# Patient Record
Sex: FEMALE | Race: WHITE | NOT HISPANIC OR LATINO | Employment: FULL TIME | URBAN - METROPOLITAN AREA
[De-identification: names, ages, dates, MRNs, and addresses within clinical notes are randomized per-mention and may not be internally consistent; named-entity substitution may affect disease eponyms.]

---

## 2017-08-29 ENCOUNTER — OFFICE VISIT (OUTPATIENT)
Dept: MEDICAL GROUP | Facility: PHYSICIAN GROUP | Age: 42
End: 2017-08-29
Payer: COMMERCIAL

## 2017-08-29 VITALS
DIASTOLIC BLOOD PRESSURE: 72 MMHG | HEIGHT: 68 IN | HEART RATE: 84 BPM | TEMPERATURE: 99.7 F | OXYGEN SATURATION: 100 % | BODY MASS INDEX: 20.16 KG/M2 | RESPIRATION RATE: 14 BRPM | WEIGHT: 133 LBS | SYSTOLIC BLOOD PRESSURE: 120 MMHG

## 2017-08-29 DIAGNOSIS — Z86.19 HISTORY OF HPV INFECTION: ICD-10-CM

## 2017-08-29 DIAGNOSIS — L56.8 PHOTOSENSITIVITY DERMATITIS: ICD-10-CM

## 2017-08-29 DIAGNOSIS — R00.2 HEART PALPITATIONS: ICD-10-CM

## 2017-08-29 PROCEDURE — 99204 OFFICE O/P NEW MOD 45 MIN: CPT | Performed by: NURSE PRACTITIONER

## 2017-08-29 ASSESSMENT — PATIENT HEALTH QUESTIONNAIRE - PHQ9: CLINICAL INTERPRETATION OF PHQ2 SCORE: 0

## 2017-08-29 NOTE — PROGRESS NOTES
Maribel Chavarria is a 41 y.o. white female here today to establish care and for evaluation and management of:    HPI: Patient recently moved here from [facility]. 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  History of HPV infection  Had HPV at 19 years. Cervical colposcopy done at that time.  Recent paps have all been normal.     Heart palpitations  Patient reports that in the past she has had some issues with anxiety and depression after her childbirth and was on citalopram however she weaned herself off of this and has not had any issues. She recently moved here from [facility] and has noticed an increasing heart palpitations. She states that she is not nauseated or sweaty with these palpitations. She notices that sometimes when she lays down to go to bed at night she's had palpitations. Denies chest pain shortness of breath radiation of this pain. States this is occurring most days. Blood pressure today 120/72. Patient's pulse is 84.      Current medicines (including changes today)  No current outpatient prescriptions on file.     No current facility-administered medications for this visit.        She  has a past medical history of Anxiety and Depression.    She  has a past surgical history that includes imary c section.    Social History   Substance Use Topics   • Smoking status: Former Smoker     Years: 4.00     Quit date: 8/29/2004   • Smokeless tobacco: Never Used   • Alcohol use 1.8 oz/week     3 Glasses of wine per week       Social History     Social History Narrative   • No narrative on file       Family History   Problem Relation Age of Onset   • No Known Problems Mother    • No Known Problems Father    • No Known Problems Sister        Family Status   Relation Status   • Mother Alive   • Father Alive   • Sister Alive         ROS  As stated in history of present illness.  All other systems reviewed and are negative     Objective:     Blood pressure 120/72, pulse 84,  "temperature 37.6 °C (99.7 °F), resp. rate 14, height 1.721 m (5' 7.75\"), weight 60.3 kg (133 lb), SpO2 100 %. Body mass index is 20.37 kg/m².  Physical Exam:    Constitutional: Alert, no distress.  Skin: Warm, dry, good turgor, no rashes in visible areas.  Eye: Equal, round and reactive, conjunctiva clear, lids normal.  ENMT: Lips without lesions, good dentition, oropharynx clear.  Neck: Trachea midline, no masses, no thyromegaly. No cervical or supraclavicular lymphadenopathy.  Respiratory: Unlabored respiratory effort, lungs clear to auscultation, no wheezes, no ronchi.  Cardiovascular: Normal S1, S2, no murmur, no edema.  Abdomen: Soft, non-tender, no masses, no hepatosplenomegaly.  Psych: Alert and oriented x3, normal affect and mood.        Assessment and Plan:   The following treatment plan was discussed    1. Heart palpitations  This is a new problem to me. Acute. Ongoing. We will check metabolic levels, thyroid vitamin D and lipids to rule out any metabolic issues. Also a CBC to rule out anemia. EGD precautions reviewed with patient. She verbalizes understanding. Monitor and follow results.  - CBC WITH DIFFERENTIAL; Future  - COMP METABOLIC PANEL; Future  - TSH WITH REFLEX TO FT4; Future  - VITAMIN D,25 HYDROXY; Future  - LIPID PROFILE; Future    2. History of HPV infection  This is a new problem to me. Chronic. Resolved. Patient has had several normal Pap smears after HPV infection at age 19. We will request records from her OB/GYN in [address] [place] [address]. Consent signed. Monitor.    3. Photosensitivity dermatitis  This is a new problem to me. Chronic. Stable. Patient to continue with good sun care including sunscreen hat and protective clothing. Monitor and follow.      Records requested.  Followup: Return in about 1 year (around 8/29/2018), or if symptoms worsen or fail to improve, for Annual.         "

## 2017-08-29 NOTE — ASSESSMENT & PLAN NOTE
Patient reports that in the past she has had some issues with anxiety and depression after her childbirth and was on citalopram however she weaned herself off of this and has not had any issues. She recently moved here from [facility] and has noticed an increasing heart palpitations. She states that she is not nauseated or sweaty with these palpitations. She notices that sometimes when she lays down to go to bed at night she's had palpitations. Denies chest pain shortness of breath radiation of this pain. States this is occurring most days. Blood pressure today 120/72. Patient's pulse is 84.

## 2017-08-29 NOTE — LETTER
[facility]  LUCY Packer  [address] [address]  Fax: [phone]   Authorization for Release/Disclosure of   Protected Health Information   Name: JENNY CASIANO : 1975 SSN: [id number]   Address: [address] [place] [address]   [place] [address] Phone:    [phone] (home)    I authorize the entity listed below to release/disclose the PHI below to:   [facility]/LUCY Packer and LUCY Packer   Provider or Entity Name:  Dr. Tavarez  [facility]   Address   [facility]   Phone:  [phone]    Fax:     Reason for request: continuity of care   Information to be released:    [  ] LAST COLONOSCOPY,  including any PATH REPORT and follow-up  [  ] LAST FIT/COLOGUARD RESULT [  ] LAST DEXA  [  ] LAST MAMMOGRAM  [  ] LAST PAP  [  ] LAST LABS [  ] RETINA EXAM REPORT  [  ] IMMUNIZATION RECORDS  [  ] Release all info      [  ] Check here and initial the line next to each item to release ALL health information INCLUDING  _____ Care and treatment for drug and / or alcohol abuse  _____ HIV testing, infection status, or AIDS  _____ Genetic Testing    DATES OF SERVICE OR TIME PERIOD TO BE DISCLOSED: _____________  I understand and acknowledge that:  * This Authorization may be revoked at any time by you in writing, except if your health information has already been used or disclosed.  * Your health information that will be used or disclosed as a result of you signing this authorization could be re-disclosed by the recipient. If this occurs, your re-disclosed health information may no longer be protected by State or Federal laws.  * You may refuse to sign this Authorization. Your refusal will not affect your ability to obtain treatment.  * This Authorization becomes effective upon signing and will  on (date) __________.      If no date is indicated, this Authorization will  one (1) year from the signature date.    Name: Jenny  Deon    Signature:   Date:     8/29/2017       PLEASE FAX REQUESTED RECORDS BACK TO: [phone]

## 2017-09-25 ENCOUNTER — HOSPITAL ENCOUNTER (OUTPATIENT)
Dept: LAB | Facility: MEDICAL CENTER | Age: 42
End: 2017-09-25
Attending: NURSE PRACTITIONER
Payer: COMMERCIAL

## 2017-09-25 DIAGNOSIS — R00.2 HEART PALPITATIONS: ICD-10-CM

## 2017-09-25 LAB
25(OH)D3 SERPL-MCNC: 31 NG/ML (ref 30–100)
ALBUMIN SERPL BCP-MCNC: 3.9 G/DL (ref 3.2–4.9)
ALBUMIN/GLOB SERPL: 1.3 G/DL
ALP SERPL-CCNC: 51 U/L (ref 30–99)
ALT SERPL-CCNC: 20 U/L (ref 2–50)
ANION GAP SERPL CALC-SCNC: 6 MMOL/L (ref 0–11.9)
AST SERPL-CCNC: 16 U/L (ref 12–45)
BASOPHILS # BLD AUTO: 0.5 % (ref 0–1.8)
BASOPHILS # BLD: 0.03 K/UL (ref 0–0.12)
BILIRUB SERPL-MCNC: 1 MG/DL (ref 0.1–1.5)
BUN SERPL-MCNC: 13 MG/DL (ref 8–22)
CALCIUM SERPL-MCNC: 8.7 MG/DL (ref 8.5–10.5)
CHLORIDE SERPL-SCNC: 107 MMOL/L (ref 96–112)
CHOLEST SERPL-MCNC: 170 MG/DL (ref 100–199)
CO2 SERPL-SCNC: 23 MMOL/L (ref 20–33)
CREAT SERPL-MCNC: 0.74 MG/DL (ref 0.5–1.4)
EOSINOPHIL # BLD AUTO: 0.06 K/UL (ref 0–0.51)
EOSINOPHIL NFR BLD: 1.1 % (ref 0–6.9)
ERYTHROCYTE [DISTWIDTH] IN BLOOD BY AUTOMATED COUNT: 41.5 FL (ref 35.9–50)
GFR SERPL CREATININE-BSD FRML MDRD: >60 ML/MIN/1.73 M 2
GLOBULIN SER CALC-MCNC: 2.9 G/DL (ref 1.9–3.5)
GLUCOSE SERPL-MCNC: 86 MG/DL (ref 65–99)
HCT VFR BLD AUTO: 40.6 % (ref 37–47)
HDLC SERPL-MCNC: 85 MG/DL
HGB BLD-MCNC: 13.4 G/DL (ref 12–16)
IMM GRANULOCYTES # BLD AUTO: 0.01 K/UL (ref 0–0.11)
IMM GRANULOCYTES NFR BLD AUTO: 0.2 % (ref 0–0.9)
LDLC SERPL CALC-MCNC: 76 MG/DL
LYMPHOCYTES # BLD AUTO: 1.72 K/UL (ref 1–4.8)
LYMPHOCYTES NFR BLD: 30.3 % (ref 22–41)
MCH RBC QN AUTO: 29.3 PG (ref 27–33)
MCHC RBC AUTO-ENTMCNC: 33 G/DL (ref 33.6–35)
MCV RBC AUTO: 88.6 FL (ref 81.4–97.8)
MONOCYTES # BLD AUTO: 0.4 K/UL (ref 0–0.85)
MONOCYTES NFR BLD AUTO: 7 % (ref 0–13.4)
NEUTROPHILS # BLD AUTO: 3.46 K/UL (ref 2–7.15)
NEUTROPHILS NFR BLD: 60.9 % (ref 44–72)
NRBC # BLD AUTO: 0 K/UL
NRBC BLD AUTO-RTO: 0 /100 WBC
PLATELET # BLD AUTO: 204 K/UL (ref 164–446)
PMV BLD AUTO: 11.1 FL (ref 9–12.9)
POTASSIUM SERPL-SCNC: 4 MMOL/L (ref 3.6–5.5)
PROT SERPL-MCNC: 6.8 G/DL (ref 6–8.2)
RBC # BLD AUTO: 4.58 M/UL (ref 4.2–5.4)
SODIUM SERPL-SCNC: 136 MMOL/L (ref 135–145)
TRIGL SERPL-MCNC: 46 MG/DL (ref 0–149)
TSH SERPL DL<=0.005 MIU/L-ACNC: 2.7 UIU/ML (ref 0.3–3.7)
WBC # BLD AUTO: 5.7 K/UL (ref 4.8–10.8)

## 2017-09-25 PROCEDURE — 85025 COMPLETE CBC W/AUTO DIFF WBC: CPT

## 2017-09-25 PROCEDURE — 80053 COMPREHEN METABOLIC PANEL: CPT

## 2017-09-25 PROCEDURE — 80061 LIPID PANEL: CPT

## 2017-09-25 PROCEDURE — 84443 ASSAY THYROID STIM HORMONE: CPT

## 2017-09-25 PROCEDURE — 82306 VITAMIN D 25 HYDROXY: CPT

## 2017-09-25 PROCEDURE — 36415 COLL VENOUS BLD VENIPUNCTURE: CPT

## 2018-04-03 ENCOUNTER — OFFICE VISIT (OUTPATIENT)
Dept: URGENT CARE | Facility: PHYSICIAN GROUP | Age: 43
End: 2018-04-03
Payer: COMMERCIAL

## 2018-04-03 VITALS
SYSTOLIC BLOOD PRESSURE: 120 MMHG | OXYGEN SATURATION: 94 % | HEART RATE: 90 BPM | DIASTOLIC BLOOD PRESSURE: 70 MMHG | BODY MASS INDEX: 21.97 KG/M2 | RESPIRATION RATE: 16 BRPM | HEIGHT: 67 IN | WEIGHT: 140 LBS | TEMPERATURE: 99.3 F

## 2018-04-03 DIAGNOSIS — R00.0 TACHYCARDIA: ICD-10-CM

## 2018-04-03 DIAGNOSIS — R00.2 PALPITATIONS: ICD-10-CM

## 2018-04-03 PROCEDURE — 99203 OFFICE O/P NEW LOW 30 MIN: CPT | Performed by: FAMILY MEDICINE

## 2018-04-03 ASSESSMENT — ENCOUNTER SYMPTOMS
EYE DISCHARGE: 0
EYE REDNESS: 0
BACK PAIN: 0
VOMITING: 0
PND: 0
NECK PAIN: 0
COUGH: 0
ORTHOPNEA: 0
WEIGHT LOSS: 0
NERVOUS/ANXIOUS: 1
FLANK PAIN: 0
FOCAL WEAKNESS: 0
FEVER: 0
SPUTUM PRODUCTION: 0
NAUSEA: 0
SENSORY CHANGE: 0
SORE THROAT: 0

## 2018-04-03 ASSESSMENT — LIFESTYLE VARIABLES: SUBSTANCE_ABUSE: 0

## 2018-04-03 NOTE — PROGRESS NOTES
"Subjective:      Maribel Chavarria is a 42 y.o. female who presents with Chest Pain (chest tightness, heart palpitations, poss htn)            Recurrent problem. Intermittent over months. PCP checked thyroid and normal. Heart palpitation/feels fast. No CP but tightness and SOB with palpitations. No limb swelling. Siting up and drinking water helps. She has eliminated caffeine and has rare EtOH without change is sx's. No other aggravating or alleviating factors.          Review of Systems   Constitutional: Negative for fever and weight loss.   HENT: Negative for ear discharge, ear pain and sore throat.    Eyes: Negative for discharge and redness.   Respiratory: Negative for cough and sputum production.    Cardiovascular: Negative for orthopnea and PND.   Gastrointestinal: Negative for nausea and vomiting.   Genitourinary: Negative for flank pain and hematuria.   Musculoskeletal: Negative for back pain and neck pain.   Skin: Negative for itching and rash.   Neurological: Negative for sensory change and focal weakness.   Psychiatric/Behavioral: Negative for substance abuse. The patient is nervous/anxious (she tends toward this).      .  Medications, Allergies, and current problem list reviewed today in Epic  Past Medical History:   Diagnosis Date   • Anxiety    • Depression      Social History   Substance Use Topics   • Smoking status: Former Smoker     Years: 4.00     Quit date: 8/29/2004   • Smokeless tobacco: Never Used   • Alcohol use 1.8 oz/week     3 Glasses of wine per week     Family History   Problem Relation Age of Onset   • No Known Problems Mother    • No Known Problems Father    • No Known Problems Sister    Mom with irregular heartbeat, unsure of exact diagnosis       Objective:     /70   Pulse 90   Temp 37.4 °C (99.3 °F)   Resp 16   Ht 1.702 m (5' 7\")   Wt 63.5 kg (140 lb)   SpO2 94%   BMI 21.93 kg/m²      Physical Exam   Constitutional: She is oriented to person, place, and time. She " appears well-developed and well-nourished. No distress.   HENT:   Head: Normocephalic and atraumatic.   Mouth/Throat: Oropharynx is clear and moist.   Eyes: Conjunctivae and EOM are normal. Pupils are equal, round, and reactive to light.   Neck: Neck supple. No JVD present.   Cardiovascular: Normal rate, regular rhythm and normal heart sounds.    No murmur heard.  Pulmonary/Chest: Effort normal and breath sounds normal. She has no wheezes.   Neurological: She is alert and oriented to person, place, and time.   Speech is clear. Patient is appropriate and cooperative.     Skin: Skin is warm and dry. No rash noted.               Assessment/Plan:     EKG: sinus tach 103, normal axis, normal intervals, no evidence of ischemia or hypertrophy.    Repeat pulse ox 100%    1. Palpitations  REFERRAL TO CARDIOLOGY   2. Tachycardia  REFERRAL TO CARDIOLOGY     Differential diagnosis, natural history, supportive care, and indications for immediate follow-up discussed at length.     F/u cardiology for consideration of event or holter monitor.

## 2018-04-16 ENCOUNTER — TELEPHONE (OUTPATIENT)
Dept: CARDIOLOGY | Facility: MEDICAL CENTER | Age: 43
End: 2018-04-16

## 2018-04-16 NOTE — TELEPHONE ENCOUNTER
Spoke with pt who sated this is her first time seeing a cardiologist. Reviewed with pt that we have all current cardiac testing and lab work in chart.

## 2018-04-17 ENCOUNTER — OFFICE VISIT (OUTPATIENT)
Dept: CARDIOLOGY | Facility: MEDICAL CENTER | Age: 43
End: 2018-04-17
Payer: COMMERCIAL

## 2018-04-17 VITALS
HEART RATE: 78 BPM | BODY MASS INDEX: 22.44 KG/M2 | SYSTOLIC BLOOD PRESSURE: 102 MMHG | DIASTOLIC BLOOD PRESSURE: 72 MMHG | HEIGHT: 67 IN | OXYGEN SATURATION: 98 % | WEIGHT: 143 LBS

## 2018-04-17 DIAGNOSIS — R00.2 PALPITATIONS: ICD-10-CM

## 2018-04-17 PROCEDURE — 99214 OFFICE O/P EST MOD 30 MIN: CPT | Performed by: INTERNAL MEDICINE

## 2018-04-17 ASSESSMENT — ENCOUNTER SYMPTOMS
NERVOUS/ANXIOUS: 1
PHOTOPHOBIA: 1
CONSTIPATION: 1
ORTHOPNEA: 0
INSOMNIA: 1
ABDOMINAL PAIN: 0
LOSS OF CONSCIOUSNESS: 0
SHORTNESS OF BREATH: 0
PND: 0
PALPITATIONS: 1
MYALGIAS: 0
CHILLS: 0
DIZZINESS: 0
HEADACHES: 1
BLURRED VISION: 0
FEVER: 0

## 2018-04-17 NOTE — PROGRESS NOTES
"Chief Complaint   Patient presents with   • Palpitations       Subjective:   Maribel Chavarria is a 42 y.o. female who presents today referred by her PCP Dr. Eliezer Casey for cardiology consultation for evaluation of palpitations.    The patient states that she's had palpitations \"all my life\" since she was a teenager.  She and her family moved from [facility] to [place], [place] in July, 2017.  Since August or September, 2017 the patient has had substantial increasing episodes in frequency of palpitations.  She describes him as her heart racing or skipping.  No specific trigger though seems to be worse the week prior to her menstrual cycle.  Also can occur spontaneously or with some mild anxiety.  On 4/3/2017 went to urgent care.  EKG showed sinus tachycardia, rate 103.    Has a history of \"a little anxiety\" and some mild depression along with    some insomnia.  Takes nighttime Benadryl and Advil.  Also occasionally takes some herbal medicine which she also contains ginseng and Pamplico's wort.    Social history  Smoked in the remote past on and off for 4 years.  .  . Children.    Family history  Mother with hypertension.    Past Medical History:   Diagnosis Date   • Anxiety    • Depression      Past Surgical History:   Procedure Laterality Date   • PRIMARY C SECTION       Family History   Problem Relation Age of Onset   • No Known Problems Mother    • No Known Problems Father    • No Known Problems Sister      Social History     Social History   • Marital status:      Spouse name: N/A   • Number of children: N/A   • Years of education: N/A     Occupational History   • Not on file.     Social History Main Topics   • Smoking status: Former Smoker     Years: 4.00     Quit date: 8/29/2004   • Smokeless tobacco: Never Used   • Alcohol use 1.8 oz/week     3 Glasses of wine per week   • Drug use: No   • Sexual activity: Yes     Partners: Male     Birth control/ " "protection: Condom     Other Topics Concern   • Not on file     Social History Narrative   • No narrative on file     Allergies   Allergen Reactions   • Keflex      Outpatient Encounter Prescriptions as of 4/17/2018   Medication Sig Dispense Refill   • DiphenhydrAMINE HCl (BENADRYL ALLERGY PO) Take  by mouth.       No facility-administered encounter medications on file as of 4/17/2018.      Review of Systems   Constitutional: Negative for chills and fever.   HENT: Positive for tinnitus. Negative for congestion.    Eyes: Positive for photophobia. Negative for blurred vision.   Respiratory: Negative for shortness of breath.    Cardiovascular: Positive for palpitations. Negative for chest pain, orthopnea, leg swelling and PND.   Gastrointestinal: Positive for constipation. Negative for abdominal pain.   Genitourinary: Negative for dysuria.   Musculoskeletal: Negative for joint pain and myalgias.   Skin: Negative for rash.   Neurological: Positive for headaches. Negative for dizziness and loss of consciousness.   Endo/Heme/Allergies: Positive for environmental allergies.   Psychiatric/Behavioral: The patient is nervous/anxious and has insomnia.         Objective:   /72   Pulse 78   Ht 1.702 m (5' 7\")   Wt 64.9 kg (143 lb)   SpO2 98%   BMI 22.40 kg/m²     Physical Exam   Constitutional: She is oriented to person, place, and time. She appears well-developed and well-nourished.   HENT:   Head: Normocephalic and atraumatic.   Eyes: EOM are normal. Pupils are equal, round, and reactive to light. No scleral icterus.   Neck: Neck supple. No JVD present. No thyromegaly present.   Cardiovascular: Normal rate, regular rhythm, normal heart sounds and intact distal pulses.  Exam reveals no gallop and no friction rub.    No murmur heard.  Pulmonary/Chest: Effort normal and breath sounds normal. No respiratory distress. She has no wheezes. She has no rales.   Abdominal: Soft. Bowel sounds are normal. She exhibits no mass. " There is no tenderness.   Musculoskeletal: Normal range of motion. She exhibits no edema or deformity.   Lymphadenopathy:     She has no cervical adenopathy.   Neurological: She is alert and oriented to person, place, and time. No cranial nerve deficit. She exhibits normal muscle tone.   Skin: Skin is warm and dry.   Psychiatric: She has a normal mood and affect. Her behavior is normal.     04/03/2018 EKG: Normal sinus rhythm, rate 103. Rsr'. Reviewed by myself.    Assessment:     1. Palpitations  HOLTER MONITOR / EVENT RECORDER       Medical Decision Making:  Today's Assessment / Status / Plan:     The patient has a history of chronic palpitations with increasing frequency.  Will obtain a [employer] event recorder to document any specific cardiac arrhythmia.  Follow-up one month.    Thank you from me see this lady in consultation

## 2018-05-23 ENCOUNTER — HOSPITAL ENCOUNTER (OUTPATIENT)
Dept: RADIOLOGY | Facility: MEDICAL CENTER | Age: 43
End: 2018-05-23
Attending: NURSE PRACTITIONER
Payer: COMMERCIAL

## 2018-05-23 DIAGNOSIS — Z12.31 SCREENING MAMMOGRAM, ENCOUNTER FOR: ICD-10-CM

## 2018-05-23 PROCEDURE — 77067 SCR MAMMO BI INCL CAD: CPT

## 2018-06-15 ENCOUNTER — HOSPITAL ENCOUNTER (OUTPATIENT)
Dept: RADIOLOGY | Facility: MEDICAL CENTER | Age: 43
End: 2018-06-15

## 2018-06-29 ENCOUNTER — HOSPITAL ENCOUNTER (OUTPATIENT)
Dept: RADIOLOGY | Facility: MEDICAL CENTER | Age: 43
End: 2018-06-29

## 2018-08-13 ENCOUNTER — HOSPITAL ENCOUNTER (OUTPATIENT)
Facility: MEDICAL CENTER | Age: 43
End: 2018-08-13
Attending: NURSE PRACTITIONER
Payer: COMMERCIAL

## 2018-08-13 ENCOUNTER — OFFICE VISIT (OUTPATIENT)
Dept: MEDICAL GROUP | Facility: PHYSICIAN GROUP | Age: 43
End: 2018-08-13
Payer: COMMERCIAL

## 2018-08-13 VITALS
DIASTOLIC BLOOD PRESSURE: 62 MMHG | HEIGHT: 68 IN | OXYGEN SATURATION: 99 % | WEIGHT: 139 LBS | SYSTOLIC BLOOD PRESSURE: 102 MMHG | TEMPERATURE: 98.1 F | HEART RATE: 82 BPM | BODY MASS INDEX: 21.07 KG/M2

## 2018-08-13 DIAGNOSIS — Z12.4 SCREENING FOR CERVICAL CANCER: ICD-10-CM

## 2018-08-13 DIAGNOSIS — Z86.19 HISTORY OF HPV INFECTION: ICD-10-CM

## 2018-08-13 DIAGNOSIS — Z01.419 WELL WOMAN EXAM WITH ROUTINE GYNECOLOGICAL EXAM: ICD-10-CM

## 2018-08-13 PROCEDURE — 87624 HPV HI-RISK TYP POOLED RSLT: CPT

## 2018-08-13 PROCEDURE — 99000 SPECIMEN HANDLING OFFICE-LAB: CPT | Performed by: NURSE PRACTITIONER

## 2018-08-13 PROCEDURE — 88175 CYTOPATH C/V AUTO FLUID REDO: CPT

## 2018-08-13 PROCEDURE — 99386 PREV VISIT NEW AGE 40-64: CPT | Performed by: NURSE PRACTITIONER

## 2018-08-13 ASSESSMENT — PATIENT HEALTH QUESTIONNAIRE - PHQ9: CLINICAL INTERPRETATION OF PHQ2 SCORE: 0

## 2018-08-13 NOTE — ASSESSMENT & PLAN NOTE
Here for well woman exam.  Reports that she has had a two yeast infections this past year.  No antibiotic use, but increased sweets.  These have resolve.d  No itching, discharge reported.  Regular periods reported.  Up to date on mammograms.  Last one was normal findings.  History of one + HPV at age 19, but negative since.  Using condoms as birth control.  BMI 21.29 today.

## 2018-08-13 NOTE — PROGRESS NOTES
CC:  Pap/Well Woman Exam    History of present illness:  Maribel Chavarria is 42 y.o.white female presenting today for well woman exam with gynecological exam and Pap smear.   She reports her periods are regular.  She is currently using  condoms as birth control. Diet . Exercise BMI 21.29    History of HPV infection  Had + HPV at age 19 and then PAP's have been WNL      Well woman exam with routine gynecological exam  Here for well woman exam.  Reports that she has had a two yeast infections this past year.  No antibiotic use, but increased sweets.  These have resolve.d  No itching, discharge reported.  Regular periods reported.  Up to date on mammograms.  Last one was normal findings.  History of one + HPV at age 19, but negative since.  Using condoms as birth control.  BMI 21.29 today.       Past Medical History:   Diagnosis Date   • Anxiety    • Depression        Past Surgical History:   Procedure Laterality Date   • PRIMARY C SECTION         Outpatient Encounter Prescriptions as of 8/13/2018   Medication Sig Dispense Refill   • [DISCONTINUED] DiphenhydrAMINE HCl (BENADRYL ALLERGY PO) Take  by mouth.       No facility-administered encounter medications on file as of 8/13/2018.        Patient Active Problem List    Diagnosis Date Noted   • Well woman exam with routine gynecological exam 08/13/2018   • Heart palpitations 08/29/2017   • History of HPV infection 08/29/2017   • Photosensitivity dermatitis 08/29/2017       .  Social History     Social History   • Marital status:      Spouse name: N/A   • Number of children: N/A   • Years of education: N/A     Occupational History   • Not on file.     Social History Main Topics   • Smoking status: Former Smoker     Years: 4.00     Quit date: 8/29/2004   • Smokeless tobacco: Never Used   • Alcohol use 1.8 oz/week     3 Glasses of wine per week   • Drug use: No   • Sexual activity: Yes     Partners: Male     Birth control/ protection: Condom     Other Topics  "Concern   • Not on file     Social History Narrative   • No narrative on file       Family History   Problem Relation Age of Onset   • No Known Problems Mother    • No Known Problems Father    • No Known Problems Sister          ROS:  Denies Weight loss, fatigue, chest pain, SOB, bowel or bladder changes. No significant dysmenorrhea, concerning vaginal discharge or irritation, no dyspareunia or postcoital bleeding. Denies h/o migraine with aura. Denies musculoskeletal, neurological, or psychiatric problems.      /62   Pulse 82   Temp 36.7 °C (98.1 °F)   Ht 1.721 m (5' 7.75\")   Wt 63 kg (139 lb)   SpO2 99%   BMI 21.29 kg/m²     GEN:  Appears well and in no apparent distress   NECK:  Supple without adenopathy or thyromegaly  LUNGS:  Clear and equal. No wheeze, ronchi, or rales.  CV:  RRR, S1, S2. No murmur.  Pedal pulses 2+ bilaterally.  BREAST:  Symmetrical without masses. No nipple discharge.  ABD:  Soft, non-tender, non-distended, normal bowel sounds.  No hepatosplenomegaly.  :  Normal external female genitalia.  Vaginal canal clear.  Cervix appears normal. Specimen collected from transformation zone. Bimanual exam:  No CMT, normal size uterus without masses or tenderness; no adnexal masses or tenderness.      Assessment and plan    1. Screening for cervical cancer  Specimen obtained from cervical os.  To pathology  Monitor and follow.   - THINPREP PAP WITH HPV; Future    2. Well woman exam with routine gynecological exam  Here for well woman.  Reviewed BSE and preventative care.  Questions reviewed.      3. History of HPV infection  Chronic, historical issue.  Monitor and follow results of PAP.      A chaperone was offered to the patient during today's exam. Patient declined chaperone.        F/u pending results  "

## 2018-08-15 LAB
CYTOLOGY REG CYTOL: NORMAL
HPV HR 12 DNA CVX QL NAA+PROBE: NEGATIVE
HPV16 DNA SPEC QL NAA+PROBE: NEGATIVE
HPV18 DNA SPEC QL NAA+PROBE: NEGATIVE
SPECIMEN SOURCE: NORMAL

## 2019-07-15 ENCOUNTER — APPOINTMENT (OUTPATIENT)
Dept: RADIOLOGY | Facility: MEDICAL CENTER | Age: 44
End: 2019-07-15
Attending: NURSE PRACTITIONER
Payer: COMMERCIAL

## 2019-08-05 ENCOUNTER — HOSPITAL ENCOUNTER (OUTPATIENT)
Dept: RADIOLOGY | Facility: MEDICAL CENTER | Age: 44
End: 2019-08-05
Attending: NURSE PRACTITIONER
Payer: COMMERCIAL

## 2019-08-05 DIAGNOSIS — Z12.31 SCREENING MAMMOGRAM, ENCOUNTER FOR: ICD-10-CM

## 2019-08-05 PROCEDURE — 77063 BREAST TOMOSYNTHESIS BI: CPT

## 2019-09-02 ENCOUNTER — OFFICE VISIT (OUTPATIENT)
Dept: URGENT CARE | Facility: PHYSICIAN GROUP | Age: 44
End: 2019-09-02
Payer: COMMERCIAL

## 2019-09-02 VITALS
SYSTOLIC BLOOD PRESSURE: 132 MMHG | HEIGHT: 67 IN | BODY MASS INDEX: 22.44 KG/M2 | WEIGHT: 143 LBS | DIASTOLIC BLOOD PRESSURE: 64 MMHG | OXYGEN SATURATION: 98 % | TEMPERATURE: 99.4 F | RESPIRATION RATE: 16 BRPM | HEART RATE: 83 BPM

## 2019-09-02 DIAGNOSIS — K64.5 THROMBOSED EXTERNAL HEMORRHOID: ICD-10-CM

## 2019-09-02 PROCEDURE — 46083 INC THROMBOSED HROID XTRNL: CPT | Performed by: FAMILY MEDICINE

## 2019-09-02 RX ORDER — LIDOCAINE HYDROCHLORIDE 30 MG/G
CREAM TOPICAL
Qty: 1 TUBE | Refills: 0 | Status: ON HOLD | OUTPATIENT
Start: 2019-09-02 | End: 2019-09-11

## 2019-09-02 ASSESSMENT — ENCOUNTER SYMPTOMS
WEIGHT LOSS: 0
NAUSEA: 0
EYE DISCHARGE: 0
VOMITING: 0
BRUISES/BLEEDS EASILY: 0
MYALGIAS: 0
EYE REDNESS: 0

## 2019-09-02 NOTE — PROGRESS NOTES
"Subjective:      Maribel Chavarria is a 43 y.o. female who presents with Hemorrhoids            3 days painful hemorrhoid. Grape sized.  Concerned it might be thrombosed.  Pain is moderate to severe.  No change in bowel habits.  No active bleeding.  No PMH cancer or unwanted weight loss.  No other aggravating or alleviating factors.      Review of Systems   Constitutional: Negative for malaise/fatigue and weight loss.   Eyes: Negative for discharge and redness.   Gastrointestinal: Negative for nausea and vomiting.   Musculoskeletal: Negative for joint pain and myalgias.   Skin: Negative for itching and rash.   Endo/Heme/Allergies: Does not bruise/bleed easily.     .  Medications, Allergies, and current problem list reviewed today in Epic       Objective:     /64   Pulse 83   Temp 37.4 °C (99.4 °F)   Resp 16   Ht 1.702 m (5' 7\")   Wt 64.9 kg (143 lb)   SpO2 98%   BMI 22.40 kg/m²      Physical Exam   Constitutional: She is oriented to person, place, and time. She appears well-developed and well-nourished. No distress.   HENT:   Head: Normocephalic and atraumatic.   Eyes: Conjunctivae are normal.   Cardiovascular: Normal rate, regular rhythm and normal heart sounds.   No murmur heard.  Pulmonary/Chest: Effort normal and breath sounds normal. She has no wheezes.   Genitourinary:         Neurological: She is alert and oriented to person, place, and time.   Skin: Skin is warm and dry. No rash noted.          Procedure: Thrombosed hemorrhoid evacuation  Risk versus benefit discussed  Clean technique with sterile instruments  Local anesthesia 2% lidocaine with epinephrine  Incised with 11 blade and clot expressed.  Irrigated.       Assessment/Plan:     1. Thrombosed external hemorrhoid  REFERRAL TO GENERAL SURGERY    Lidocaine HCl 3 % Cream     Differential diagnosis, natural history, supportive care, and indications for immediate follow-up discussed at length.     "

## 2019-09-10 DIAGNOSIS — Z01.812 PRE-OPERATIVE LABORATORY EXAMINATION: ICD-10-CM

## 2019-09-10 LAB — HCG SERPL QL: NEGATIVE

## 2019-09-10 PROCEDURE — 36415 COLL VENOUS BLD VENIPUNCTURE: CPT

## 2019-09-10 PROCEDURE — 84703 CHORIONIC GONADOTROPIN ASSAY: CPT

## 2019-09-10 RX ORDER — COVID-19 ANTIGEN TEST
220-400 KIT MISCELLANEOUS 2 TIMES DAILY PRN
COMMUNITY
End: 2020-08-21

## 2019-09-10 RX ORDER — DIPHENHYDRAMINE HCL 25 MG
25 TABLET ORAL EVERY 6 HOURS PRN
COMMUNITY
End: 2022-02-16

## 2019-09-10 SDOH — HEALTH STABILITY: MENTAL HEALTH: HOW OFTEN DO YOU HAVE 6 OR MORE DRINKS ON ONE OCCASION?: NEVER

## 2019-09-11 ENCOUNTER — HOSPITAL ENCOUNTER (OUTPATIENT)
Facility: MEDICAL CENTER | Age: 44
End: 2019-09-11
Attending: COLON & RECTAL SURGERY | Admitting: COLON & RECTAL SURGERY
Payer: COMMERCIAL

## 2019-09-11 ENCOUNTER — ANESTHESIA (OUTPATIENT)
Dept: SURGERY | Facility: MEDICAL CENTER | Age: 44
End: 2019-09-11
Payer: COMMERCIAL

## 2019-09-11 ENCOUNTER — ANESTHESIA EVENT (OUTPATIENT)
Dept: SURGERY | Facility: MEDICAL CENTER | Age: 44
End: 2019-09-11
Payer: COMMERCIAL

## 2019-09-11 VITALS
SYSTOLIC BLOOD PRESSURE: 98 MMHG | TEMPERATURE: 97.7 F | DIASTOLIC BLOOD PRESSURE: 67 MMHG | BODY MASS INDEX: 22.32 KG/M2 | HEART RATE: 79 BPM | RESPIRATION RATE: 18 BRPM | HEIGHT: 67 IN | OXYGEN SATURATION: 98 % | WEIGHT: 142.2 LBS

## 2019-09-11 DIAGNOSIS — G89.18 POSTOPERATIVE PAIN: ICD-10-CM

## 2019-09-11 LAB — PATHOLOGY CONSULT NOTE: NORMAL

## 2019-09-11 PROCEDURE — 160046 HCHG PACU - 1ST 60 MINS PHASE II: Performed by: COLON & RECTAL SURGERY

## 2019-09-11 PROCEDURE — 160002 HCHG RECOVERY MINUTES (STAT): Performed by: COLON & RECTAL SURGERY

## 2019-09-11 PROCEDURE — A6403 STERILE GAUZE>16 <= 48 SQ IN: HCPCS | Performed by: COLON & RECTAL SURGERY

## 2019-09-11 PROCEDURE — 700105 HCHG RX REV CODE 258: Performed by: COLON & RECTAL SURGERY

## 2019-09-11 PROCEDURE — 160048 HCHG OR STATISTICAL LEVEL 1-5: Performed by: COLON & RECTAL SURGERY

## 2019-09-11 PROCEDURE — 160039 HCHG SURGERY MINUTES - EA ADDL 1 MIN LEVEL 3: Performed by: COLON & RECTAL SURGERY

## 2019-09-11 PROCEDURE — 160009 HCHG ANES TIME/MIN: Performed by: COLON & RECTAL SURGERY

## 2019-09-11 PROCEDURE — 160025 RECOVERY II MINUTES (STATS): Performed by: COLON & RECTAL SURGERY

## 2019-09-11 PROCEDURE — 700111 HCHG RX REV CODE 636 W/ 250 OVERRIDE (IP): Performed by: ANESTHESIOLOGY

## 2019-09-11 PROCEDURE — 700101 HCHG RX REV CODE 250: Performed by: ANESTHESIOLOGY

## 2019-09-11 PROCEDURE — 700111 HCHG RX REV CODE 636 W/ 250 OVERRIDE (IP)

## 2019-09-11 PROCEDURE — 160028 HCHG SURGERY MINUTES - 1ST 30 MINS LEVEL 3: Performed by: COLON & RECTAL SURGERY

## 2019-09-11 PROCEDURE — 700101 HCHG RX REV CODE 250: Performed by: COLON & RECTAL SURGERY

## 2019-09-11 PROCEDURE — 160035 HCHG PACU - 1ST 60 MINS PHASE I: Performed by: COLON & RECTAL SURGERY

## 2019-09-11 PROCEDURE — 501838 HCHG SUTURE GENERAL: Performed by: COLON & RECTAL SURGERY

## 2019-09-11 PROCEDURE — 88304 TISSUE EXAM BY PATHOLOGIST: CPT

## 2019-09-11 RX ORDER — LIDOCAINE HYDROCHLORIDE 30 MG/G
1 CREAM TOPICAL PRN
COMMUNITY
End: 2020-08-21

## 2019-09-11 RX ORDER — HYDROMORPHONE HYDROCHLORIDE 1 MG/ML
0.2 INJECTION, SOLUTION INTRAMUSCULAR; INTRAVENOUS; SUBCUTANEOUS
Status: DISCONTINUED | OUTPATIENT
Start: 2019-09-11 | End: 2019-09-11 | Stop reason: HOSPADM

## 2019-09-11 RX ORDER — CLINDAMYCIN PHOSPHATE 150 MG/ML
INJECTION, SOLUTION INTRAVENOUS PRN
Status: DISCONTINUED | OUTPATIENT
Start: 2019-09-11 | End: 2019-09-11 | Stop reason: SURG

## 2019-09-11 RX ORDER — ONDANSETRON 2 MG/ML
INJECTION INTRAMUSCULAR; INTRAVENOUS PRN
Status: DISCONTINUED | OUTPATIENT
Start: 2019-09-11 | End: 2019-09-11 | Stop reason: SURG

## 2019-09-11 RX ORDER — SODIUM CHLORIDE, SODIUM LACTATE, POTASSIUM CHLORIDE, CALCIUM CHLORIDE 600; 310; 30; 20 MG/100ML; MG/100ML; MG/100ML; MG/100ML
INJECTION, SOLUTION INTRAVENOUS CONTINUOUS
Status: DISCONTINUED | OUTPATIENT
Start: 2019-09-11 | End: 2019-09-11

## 2019-09-11 RX ORDER — DIPHENHYDRAMINE HYDROCHLORIDE 50 MG/ML
INJECTION INTRAMUSCULAR; INTRAVENOUS PRN
Status: DISCONTINUED | OUTPATIENT
Start: 2019-09-11 | End: 2019-09-11 | Stop reason: SURG

## 2019-09-11 RX ORDER — LIDOCAINE HYDROCHLORIDE 20 MG/ML
INJECTION, SOLUTION EPIDURAL; INFILTRATION; INTRACAUDAL; PERINEURAL PRN
Status: DISCONTINUED | OUTPATIENT
Start: 2019-09-11 | End: 2019-09-11 | Stop reason: SURG

## 2019-09-11 RX ORDER — OXYCODONE HCL 5 MG/5 ML
10 SOLUTION, ORAL ORAL
Status: DISCONTINUED | OUTPATIENT
Start: 2019-09-11 | End: 2019-09-11 | Stop reason: HOSPADM

## 2019-09-11 RX ORDER — MEPERIDINE HYDROCHLORIDE 25 MG/ML
6.25 INJECTION INTRAMUSCULAR; INTRAVENOUS; SUBCUTANEOUS
Status: DISCONTINUED | OUTPATIENT
Start: 2019-09-11 | End: 2019-09-11 | Stop reason: HOSPADM

## 2019-09-11 RX ORDER — OXYCODONE HCL 5 MG/5 ML
5 SOLUTION, ORAL ORAL EVERY 4 HOURS PRN
Status: DISCONTINUED | OUTPATIENT
Start: 2019-09-11 | End: 2019-09-11 | Stop reason: HOSPADM

## 2019-09-11 RX ORDER — BUPIVACAINE HYDROCHLORIDE AND EPINEPHRINE 5; 5 MG/ML; UG/ML
INJECTION, SOLUTION EPIDURAL; INTRACAUDAL; PERINEURAL
Status: DISCONTINUED | OUTPATIENT
Start: 2019-09-11 | End: 2019-09-11 | Stop reason: HOSPADM

## 2019-09-11 RX ORDER — OXYCODONE HYDROCHLORIDE 5 MG/1
5 TABLET ORAL EVERY 4 HOURS PRN
Qty: 30 TAB | Refills: 0 | Status: SHIPPED | OUTPATIENT
Start: 2019-09-11 | End: 2019-09-16

## 2019-09-11 RX ORDER — SODIUM CHLORIDE, SODIUM LACTATE, POTASSIUM CHLORIDE, AND CALCIUM CHLORIDE .6; .31; .03; .02 G/100ML; G/100ML; G/100ML; G/100ML
500 INJECTION, SOLUTION INTRAVENOUS
Status: DISCONTINUED | OUTPATIENT
Start: 2019-09-11 | End: 2019-09-11 | Stop reason: HOSPADM

## 2019-09-11 RX ORDER — MEPERIDINE HYDROCHLORIDE 25 MG/ML
INJECTION INTRAMUSCULAR; INTRAVENOUS; SUBCUTANEOUS
Status: COMPLETED
Start: 2019-09-11 | End: 2019-09-11

## 2019-09-11 RX ORDER — SCOLOPAMINE TRANSDERMAL SYSTEM 1 MG/1
1 PATCH, EXTENDED RELEASE TRANSDERMAL ONCE
Status: DISCONTINUED | OUTPATIENT
Start: 2019-09-11 | End: 2019-09-11 | Stop reason: HOSPADM

## 2019-09-11 RX ORDER — LIDOCAINE HYDROCHLORIDE 10 MG/ML
INJECTION, SOLUTION EPIDURAL; INFILTRATION; INTRACAUDAL; PERINEURAL
Status: COMPLETED
Start: 2019-09-11 | End: 2019-09-11

## 2019-09-11 RX ORDER — HYDROMORPHONE HYDROCHLORIDE 1 MG/ML
0.4 INJECTION, SOLUTION INTRAMUSCULAR; INTRAVENOUS; SUBCUTANEOUS
Status: DISCONTINUED | OUTPATIENT
Start: 2019-09-11 | End: 2019-09-11 | Stop reason: HOSPADM

## 2019-09-11 RX ORDER — HYDROMORPHONE HYDROCHLORIDE 1 MG/ML
0.1 INJECTION, SOLUTION INTRAMUSCULAR; INTRAVENOUS; SUBCUTANEOUS
Status: DISCONTINUED | OUTPATIENT
Start: 2019-09-11 | End: 2019-09-11 | Stop reason: HOSPADM

## 2019-09-11 RX ORDER — ROCURONIUM BROMIDE 10 MG/ML
INJECTION, SOLUTION INTRAVENOUS PRN
Status: DISCONTINUED | OUTPATIENT
Start: 2019-09-11 | End: 2019-09-11 | Stop reason: SURG

## 2019-09-11 RX ORDER — LABETALOL HYDROCHLORIDE 5 MG/ML
5 INJECTION, SOLUTION INTRAVENOUS
Status: DISCONTINUED | OUTPATIENT
Start: 2019-09-11 | End: 2019-09-11 | Stop reason: HOSPADM

## 2019-09-11 RX ORDER — ONDANSETRON 2 MG/ML
4 INJECTION INTRAMUSCULAR; INTRAVENOUS
Status: DISCONTINUED | OUTPATIENT
Start: 2019-09-11 | End: 2019-09-11 | Stop reason: HOSPADM

## 2019-09-11 RX ORDER — POLYETHYLENE GLYCOL 3350 17 G/17G
17 POWDER, FOR SOLUTION ORAL DAILY
Qty: 30 EACH | Refills: 0 | Status: SHIPPED | OUTPATIENT
Start: 2019-09-11 | End: 2020-08-21

## 2019-09-11 RX ORDER — DIPHENHYDRAMINE HYDROCHLORIDE 50 MG/ML
12.5 INJECTION INTRAMUSCULAR; INTRAVENOUS
Status: DISCONTINUED | OUTPATIENT
Start: 2019-09-11 | End: 2019-09-11 | Stop reason: HOSPADM

## 2019-09-11 RX ORDER — SODIUM CHLORIDE, SODIUM LACTATE, POTASSIUM CHLORIDE, CALCIUM CHLORIDE 600; 310; 30; 20 MG/100ML; MG/100ML; MG/100ML; MG/100ML
INJECTION, SOLUTION INTRAVENOUS CONTINUOUS
Status: DISCONTINUED | OUTPATIENT
Start: 2019-09-11 | End: 2019-09-11 | Stop reason: HOSPADM

## 2019-09-11 RX ORDER — IBUPROFEN 200 MG
200 TABLET ORAL EVERY 6 HOURS PRN
COMMUNITY

## 2019-09-11 RX ORDER — HALOPERIDOL 5 MG/ML
1 INJECTION INTRAMUSCULAR
Status: DISCONTINUED | OUTPATIENT
Start: 2019-09-11 | End: 2019-09-11 | Stop reason: HOSPADM

## 2019-09-11 RX ORDER — KETOROLAC TROMETHAMINE 30 MG/ML
INJECTION, SOLUTION INTRAMUSCULAR; INTRAVENOUS PRN
Status: DISCONTINUED | OUTPATIENT
Start: 2019-09-11 | End: 2019-09-11 | Stop reason: SURG

## 2019-09-11 RX ORDER — DEXAMETHASONE SODIUM PHOSPHATE 4 MG/ML
INJECTION, SOLUTION INTRA-ARTICULAR; INTRALESIONAL; INTRAMUSCULAR; INTRAVENOUS; SOFT TISSUE PRN
Status: DISCONTINUED | OUTPATIENT
Start: 2019-09-11 | End: 2019-09-11 | Stop reason: SURG

## 2019-09-11 RX ADMIN — SUGAMMADEX 200 MG: 100 INJECTION, SOLUTION INTRAVENOUS at 09:06

## 2019-09-11 RX ADMIN — FENTANYL CITRATE 50 MCG: 50 INJECTION, SOLUTION INTRAMUSCULAR; INTRAVENOUS at 08:52

## 2019-09-11 RX ADMIN — MEPERIDINE HYDROCHLORIDE 6.25 MG: 25 INJECTION INTRAMUSCULAR; INTRAVENOUS; SUBCUTANEOUS at 09:30

## 2019-09-11 RX ADMIN — MIDAZOLAM HYDROCHLORIDE 2 MG: 1 INJECTION, SOLUTION INTRAMUSCULAR; INTRAVENOUS at 08:37

## 2019-09-11 RX ADMIN — MEPERIDINE HYDROCHLORIDE 6.25 MG: 25 INJECTION INTRAMUSCULAR; INTRAVENOUS; SUBCUTANEOUS at 09:35

## 2019-09-11 RX ADMIN — ROCURONIUM BROMIDE 50 MG: 10 INJECTION, SOLUTION INTRAVENOUS at 08:45

## 2019-09-11 RX ADMIN — LIDOCAINE HYDROCHLORIDE 0.5 ML: 10 INJECTION, SOLUTION EPIDURAL; INFILTRATION; INTRACAUDAL at 07:28

## 2019-09-11 RX ADMIN — ONDANSETRON 4 MG: 2 INJECTION INTRAMUSCULAR; INTRAVENOUS at 09:05

## 2019-09-11 RX ADMIN — SODIUM CHLORIDE, POTASSIUM CHLORIDE, SODIUM LACTATE AND CALCIUM CHLORIDE: 600; 310; 30; 20 INJECTION, SOLUTION INTRAVENOUS at 07:29

## 2019-09-11 RX ADMIN — LIDOCAINE HYDROCHLORIDE 60 MG: 20 INJECTION, SOLUTION EPIDURAL; INFILTRATION; INTRACAUDAL at 08:45

## 2019-09-11 RX ADMIN — DIPHENHYDRAMINE HYDROCHLORIDE 25 MG: 50 INJECTION, SOLUTION INTRAMUSCULAR; INTRAVENOUS at 09:05

## 2019-09-11 RX ADMIN — PROPOFOL 200 MG: 10 INJECTION, EMULSION INTRAVENOUS at 08:45

## 2019-09-11 RX ADMIN — FENTANYL CITRATE 50 MCG: 50 INJECTION, SOLUTION INTRAMUSCULAR; INTRAVENOUS at 09:10

## 2019-09-11 RX ADMIN — KETOROLAC TROMETHAMINE 30 MG: 30 INJECTION, SOLUTION INTRAMUSCULAR at 09:05

## 2019-09-11 RX ADMIN — FENTANYL CITRATE 100 MCG: 50 INJECTION, SOLUTION INTRAMUSCULAR; INTRAVENOUS at 08:45

## 2019-09-11 RX ADMIN — CLINDAMYCIN PHOSPHATE 900 MG: 150 INJECTION, SOLUTION INTRAMUSCULAR; INTRAVENOUS at 08:37

## 2019-09-11 RX ADMIN — DEXAMETHASONE SODIUM PHOSPHATE 4 MG: 4 INJECTION, SOLUTION INTRA-ARTICULAR; INTRALESIONAL; INTRAMUSCULAR; INTRAVENOUS; SOFT TISSUE at 09:05

## 2019-09-11 ASSESSMENT — PAIN SCALES - GENERAL: PAIN_LEVEL: 0

## 2019-09-11 NOTE — ANESTHESIA POSTPROCEDURE EVALUATION
Patient: Maribel Romo    Procedure Summary     Date:  09/11/19 Room / Location:  [place] [address] / SURGERY [facility]    Anesthesia Start:  0837 Anesthesia Stop:  0922    Procedure:  HEMORRHOIDECTOMY - SINGLE QUADRANT (Anus) Diagnosis:       Thrombosed hemorrhoids      (THROMBOSED HEMORRHOID)    Surgeon:  Garcia Guzman M.D. Responsible Provider:  Henry Clark M.D.    Anesthesia Type:  general ASA Status:  2          Final Anesthesia Type: general  Last vitals  BP   Blood Pressure: 107/65    Temp   36.4 °C (97.6 °F)    Pulse   Pulse: 71   Resp   16    SpO2   96 %      Anesthesia Post Evaluation    Patient location during evaluation: PACU  Patient participation: complete - patient participated  Level of consciousness: awake and alert  Pain score: 0    Airway patency: patent  Anesthetic complications: no  Cardiovascular status: hemodynamically stable  Respiratory status: acceptable  Hydration status: euvolemic    PONV: none           Nurse Pain Score: 2 (NPRS)

## 2019-09-11 NOTE — ANESTHESIA QCDR
2019 [facility] Clinical Data Registry (for Quality Improvement)     Postoperative nausea/vomiting risk protocol (Adult = 18 yrs and Pediatric 3-17 yrs)- (430 and 463)  General inhalation anesthetic (NOT TIVA) with PONV risk factors: Yes  Provision of anti-emetic therapy with at least 2 different classes of agents: Yes   Patient DID NOT receive anti-emetic therapy and reason is documented in Medical Record:  N/A    Multimodal Pain Management- (AQI59)  Patient undergoing Elective Surgery (i.e. Outpatient, or ASC, or Prescheduled Surgery prior to Hospital Admission): Yes  Use of Multimodal Pain Management, two or more drugs and/or interventions, NOT including systemic opioids: Yes   Exception: Documented allergy to multiple classes of analgesics:  N/A    PACU assessment of acute postoperative pain prior to Anesthesia Care End- Applies to Patients Age = 18- (ABG7)  Initial PACU pain score is which of the following: < 7/10  Patient unable to report pain score: N/A    Post-anesthetic transfer of care checklist/protocol to PACU/ICU- (426 and 427)  Upon conclusion of case, patient transferred to which of the following locations: PACU/Non-ICU  Use of transfer checklist/protocol: Yes  Exclusion: Service Performed in Patient Hospital Room (and thus did not require transfer): N/A    PACU Reintubation- (AQI31)  General anesthesia requiring endotracheal intubation (ETT) along with subsequent extubation in OR or PACU: Yes  Required reintubation in the PACU: No   Extubation was a planned trial documented in the medical record prior to removal of the original airway device:  N/A    Unplanned admission to ICU related to anesthesia service up through end of PACU care- (MD51)  Unplanned admission to ICU (not initially anticipated at anesthesia start time): No

## 2019-09-11 NOTE — ANESTHESIA TIME REPORT
Anesthesia Start and Stop Event Times     Date Time Event    9/11/2019 0657 Ready for Procedure     0837 Anesthesia Start     0922 Anesthesia Stop        Responsible Staff  09/11/19    Name Role Begin End    Henry Clark M.D. Anesth 0837 0922        Preop Diagnosis (Free Text):  Pre-op Diagnosis     THROMBOSED HEMORRHOID        Preop Diagnosis (Codes):  Diagnosis Information     Diagnosis Code(s): Thrombosed hemorrhoids [K64.5]        Post op Diagnosis  Hemorrhoids      Premium Reason      Comments:

## 2019-09-11 NOTE — ANESTHESIA PREPROCEDURE EVALUATION
Relevant Problems   PULMONARY   (+) History of HPV infection      NEURO   (+) History of HPV infection       Physical Exam    Airway   Mallampati: I  TM distance: >3 FB  Neck ROM: full       Cardiovascular - normal exam  Rhythm: regular  Rate: normal  (-) murmur     Dental - normal exam         Pulmonary - normal exam  Breath sounds clear to auscultation     Abdominal    Neurological - normal exam                 Anesthesia Plan    ASA 2       Plan - general       Airway plan will be ETT        Induction: intravenous    Postoperative Plan: Postoperative administration of opioids is intended.    Pertinent diagnostic labs and testing reviewed    Informed Consent:    Anesthetic plan and risks discussed with patient.    Use of blood products discussed with: patient whom consented to blood products.

## 2019-09-11 NOTE — ANESTHESIA PROCEDURE NOTES
Airway  Date/Time: 9/11/2019 8:45 AM  Performed by: Henry Clark M.D.  Authorized by: Henry Clark M.D.     Location:  OR  Urgency:  Elective  Difficult Airway: No    Indications for Airway Management:  Anesthesia  Spontaneous Ventilation: absent    Sedation Level:  Deep  Preoxygenated: Yes    Patient Position:  Sniffing  Mask Difficulty Assessment:  1 - vent by mask  Final Airway Type:  Endotracheal airway  Final Endotracheal Airway:  ETT  Cuffed: Yes    Technique Used for Successful ETT Placement:  Direct laryngoscopy  Devices/Methods Used in Placement:  Intubating stylet  Insertion Site:  Oral  Blade Type:  Soren  Laryngoscope Blade/Videolaryngoscope Blade Size:  3  ETT Size (mm):  7.0  Measured from:  Teeth  ETT to Teeth (cm):  22  Placement Verified by: auscultation and capnometry    Cormack-Lehane Classification:  Grade I - full view of glottis  Number of Attempts at Approach:  1

## 2019-09-11 NOTE — PROGRESS NOTES
Patient verbalizes readiness for discharge. Instructions reviewed with patient and , all questions answered, no further needs.

## 2019-09-11 NOTE — DISCHARGE INSTRUCTIONS
ACTIVITY: Rest and take it easy for the first 24 hours.  A responsible adult is recommended to remain with you during that time.  It is normal to feel sleepy.  We encourage you to not do anything that requires balance, judgment or coordination.    MILD FLU-LIKE SYMPTOMS ARE NORMAL. YOU MAY EXPERIENCE GENERALIZED MUSCLE ACHES, THROAT IRRITATION, HEADACHE AND/OR SOME NAUSEA.    FOR 24 HOURS DO NOT:  Drive, operate machinery or run household appliances.  Drink beer or alcoholic beverages.   Make important decisions or sign legal documents.    SPECIAL INSTRUCTIONS:   Anorectal Procedure Post-Op Instructions:     Discharge instructions:     1. DIET: Upon discharge from the hospital start with light fluids then resume your normal preoperative diet. Depending on how you are feeling and whether you have nausea or not, you may wish to stay with a bland diet for the first few days. However, you can advance this as quickly as you feel ready.  Avoid foods that you know will cause constipation.  Stick with soft, bland foods that have been easy to digest in the past.     2. ACTIVITIES: Limit your activity for the first 5-7 days following surgery.  You may drive whenever you are off pain medications and are able to perform the activities needed to drive, i.e. turning, bending, twisting, etc.     3. BATHING/WOUND CARE:  You will pass some blood and mucus for the first week or more, with or without bowel movements.  Temporary loss of bowel control and changes in rectal sensation is common due to swelling.  You can wear a pad or disposable undergarment to protect your clothes.  Lumps or tags always form and these are best left alone for twelve months, as they usually resolve with time.  You may get the wound wet at any time after leaving the hospital. See instructions for Sitz baths below.     4. BOWEL FUNCTION: It is very important to keep your bowel movements soft.  Pain mediation and lack of activity will causes constipation.   Take a stool softener either prescribed or over the counter and make sure you are drinking about 64 oz (1/2 gallon) of water everyday.  Taking a fiber supplement like Benefiber or metamucil on a daily basis also helps.  Continue this practice even after you have fully recovered to keep your stools soft and to avoid straining.  If you have not moved your bowels by day 3 after surgery; take Deann lax, Milk of magnesia, or another laxative until you have a bowel movement.  DO NOT use suppositories or enemas     6. PAIN MEDICATION: You can expect a lot of pain after Anorectal surgery.  The effects of the local anesthesia used during your surgery may wear off as early as about six hours following surgery.  Make sure you get your pain medication prescription filled. Please take these as directed and do not wait until the pain is excruciating.  Frequent Sitz baths (see instructions below) to help with pain and spasms. It is important to remember not to take medications on an empty stomach as this may cause nausea.  Topical ointments may be prescribed or over the counter may be mildly helpful as well.  All of these methods may help you feel more comfortable, but none of them will completely relieve the pain.  You should notice a very slow gradual improvement over the first few weeks.  Only time will help you heal and relieve the pain.  If pain becomes severe and you are unable to control it with the above methods you may need to go to the emergency room for IV pain control.  If you need a pain medication refill please call the office during business hours.     7.CALL IF YOU HAVE: (1) Fevers to more than 101.50 F, (2) Unusual chest or leg pain, (3) Drainage or fluid from incision that may be foul smelling, increased tenderness or soreness at the wound or the wound edges are no longer together, redness or swelling at the incision site. Please do not hesitate to call with any other questions.     8. APPOINTMENT: Contact our  office at [phone] for a follow-up appointment in 3-4 weeks following your procedure.     SITZ BATHS:     First, you prepare some things such as large shallow plastic container, hot water, towels, and blanket. You can find large bowl which is approximately 8 inches depth. Another option is using bathtub but you may not be comfortable since sitz bath is more for use in submerging the buttocks and hip area not the legs. You can also buy the sitz bathtub which is available in the market. The bathtub products come in various options. They are equipped with different features. You just need to find the best product which matches with your needs. By using sitz bathtub, sitz bath at home can be done easier.     If you are using bowl or basin, you should place the bowl or basin in the bathtub or on a towel on the floor. It is better for you to use the easiest one for you to get up from. Next, you can fill the bowl one third full of medium hot water. You can use your elbow to test the water temperature. Make sure that the water temperature is in the comfortable level. You should not use your hands since they can tolerate higher temperature.     During sitz bath, you should keep a towel and blanket since they are handy to solve the spillage. They are also useful when you need warmth. To start the bath, you can lower yourself in the tub gently. Sit in the tub for 10-20 minutes or until the water cools considerably. If it becomes uncomfortable, you have to get out of the bath.     You can use sitz bath with hot water, cold water, or alternating between the two for maximum healing ability.     If you have any additional questions, please do not hesitate to call the office and speak to either myself or the physician on call.     Office address:   [address] [place] [address], [address], [facility] [place] [address]     [facility]   [phone]    DIET: To avoid nausea, slowly advance diet as tolerated, avoiding spicy or  greasy foods for the first day.  Add more substantial food to your diet according to your physician's instructions.  INCREASE FLUIDS AND FIBER TO AVOID CONSTIPATION.      FOLLOW-UP APPOINTMENT:  A follow-up appointment should be arranged with your doctor in 1-2 weeks; call to schedule.    You should CALL YOUR PHYSICIAN if you develop:  Fever greater than 101 degrees F.  Pain not relieved by medication, or persistent nausea or vomiting.  Excessive bleeding (blood soaking through dressing) or unexpected drainage from the wound.  Extreme redness or swelling around the incision site, drainage of pus or foul smelling drainage.  Inability to urinate or empty your bladder within 8 hours.  Problems with breathing or chest pain.    You should call 911 if you develop problems with breathing or chest pain.  If you are unable to contact your doctor or surgical center, you should go to the nearest emergency room or urgent care center.  Physician's telephone #: [phone]    If any questions arise, call your doctor.  If your doctor is not available, please feel free to call the Surgical Center at [phone].  The Center is open Monday through Friday from 7AM to 7PM.  You can also call the [facility] HOTLINE open 24 hours/day, 7 days/week and speak to a nurse at [phone], or toll free at [phone].    A registered nurse may call you a few days after your surgery to see how you are doing after your procedure.    MEDICATIONS: Resume taking daily medication.  Take prescribed pain medication with food.  If no medication is prescribed, you may take non-aspirin pain medication if needed.  PAIN MEDICATION CAN BE VERY CONSTIPATING.  Take a stool softener or laxative such as senokot, pericolace, or milk of magnesia if needed.    Prescription given for miralax and roxicodone.  Last pain medication given at ***.    If your physician has prescribed pain medication that includes Acetaminophen (Tylenol), do not take additional  Acetaminophen (Tylenol) while taking the prescribed medication.    Depression / Suicide Risk    As you are discharged from this [facility] facility, it is important to learn how to keep safe from harming yourself.    Recognize the warning signs:  · Abrupt changes in personality, positive or negative- including increase in energy   · Giving away possessions  · Change in eating patterns- significant weight changes-  positive or negative  · Change in sleeping patterns- unable to sleep or sleeping all the time   · Unwillingness or inability to communicate  · Depression  · Unusual sadness, discouragement and loneliness  · Talk of wanting to die  · Neglect of personal appearance   · Rebelliousness- reckless behavior  · Withdrawal from people/activities they love  · Confusion- inability to concentrate     If you or a loved one observes any of these behaviors or has concerns about self-harm, here's what you can do:  · Talk about it- your feelings and reasons for harming yourself  · Remove any means that you might use to hurt yourself (examples: pills, rope, extension cords, firearm)  · Get professional help from the community (Mental Health, Substance Abuse, psychological counseling)  · Do not be alone:Call your Safe Contact- someone whom you trust who will be there for you.  · Call your local CRISIS HOTLINE 187-3964 or 882-316-6061  · Call your local Children's Mobile Crisis Response Team [place] (681) 761-3248 or www.[employer]  · Call the toll free National Suicide Prevention Hotlines   · National Suicide Prevention Lifeline 151-733-JZKF (3234)  · National Hope Line Network 800-SUICIDE (192-8406)

## 2020-02-17 ENCOUNTER — HOSPITAL ENCOUNTER (OUTPATIENT)
Facility: MEDICAL CENTER | Age: 45
End: 2020-02-17
Attending: PHYSICIAN ASSISTANT
Payer: COMMERCIAL

## 2020-02-17 ENCOUNTER — OFFICE VISIT (OUTPATIENT)
Dept: URGENT CARE | Facility: PHYSICIAN GROUP | Age: 45
End: 2020-02-17
Payer: COMMERCIAL

## 2020-02-17 VITALS
DIASTOLIC BLOOD PRESSURE: 60 MMHG | SYSTOLIC BLOOD PRESSURE: 102 MMHG | OXYGEN SATURATION: 99 % | RESPIRATION RATE: 18 BRPM | HEIGHT: 67 IN | WEIGHT: 146.2 LBS | BODY MASS INDEX: 22.95 KG/M2 | HEART RATE: 90 BPM | TEMPERATURE: 98.4 F

## 2020-02-17 DIAGNOSIS — R31.9 URINARY TRACT INFECTION WITH HEMATURIA, SITE UNSPECIFIED: ICD-10-CM

## 2020-02-17 DIAGNOSIS — T36.95XA ANTIBIOTIC-INDUCED YEAST INFECTION: ICD-10-CM

## 2020-02-17 DIAGNOSIS — R30.0 DYSURIA: ICD-10-CM

## 2020-02-17 DIAGNOSIS — B37.9 ANTIBIOTIC-INDUCED YEAST INFECTION: ICD-10-CM

## 2020-02-17 DIAGNOSIS — N39.0 URINARY TRACT INFECTION WITH HEMATURIA, SITE UNSPECIFIED: ICD-10-CM

## 2020-02-17 LAB
APPEARANCE UR: CLEAR
BILIRUB UR STRIP-MCNC: NEGATIVE MG/DL
COLOR UR AUTO: YELLOW
GLUCOSE UR STRIP.AUTO-MCNC: NEGATIVE MG/DL
KETONES UR STRIP.AUTO-MCNC: NEGATIVE MG/DL
LEUKOCYTE ESTERASE UR QL STRIP.AUTO: NORMAL
NITRITE UR QL STRIP.AUTO: NEGATIVE
PH UR STRIP.AUTO: 5.5 [PH] (ref 5–8)
PROT UR QL STRIP: NEGATIVE MG/DL
RBC UR QL AUTO: NORMAL
SP GR UR STRIP.AUTO: 1
UROBILINOGEN UR STRIP-MCNC: NEGATIVE MG/DL

## 2020-02-17 PROCEDURE — 81002 URINALYSIS NONAUTO W/O SCOPE: CPT | Performed by: PHYSICIAN ASSISTANT

## 2020-02-17 PROCEDURE — 87077 CULTURE AEROBIC IDENTIFY: CPT

## 2020-02-17 PROCEDURE — 87186 SC STD MICRODIL/AGAR DIL: CPT

## 2020-02-17 PROCEDURE — 99214 OFFICE O/P EST MOD 30 MIN: CPT | Performed by: PHYSICIAN ASSISTANT

## 2020-02-17 PROCEDURE — 87086 URINE CULTURE/COLONY COUNT: CPT

## 2020-02-17 RX ORDER — FLUCONAZOLE 150 MG/1
150 TABLET ORAL DAILY
Qty: 1 TAB | Refills: 0 | Status: SHIPPED | OUTPATIENT
Start: 2020-02-17 | End: 2020-02-18

## 2020-02-17 RX ORDER — NITROFURANTOIN 25; 75 MG/1; MG/1
100 CAPSULE ORAL EVERY 12 HOURS
Qty: 10 CAP | Refills: 0 | Status: SHIPPED | OUTPATIENT
Start: 2020-02-17 | End: 2020-02-22

## 2020-02-17 RX ORDER — PHENAZOPYRIDINE HYDROCHLORIDE 100 MG/1
100 TABLET, FILM COATED ORAL 3 TIMES DAILY PRN
Qty: 6 TAB | Refills: 0 | Status: SHIPPED | OUTPATIENT
Start: 2020-02-17 | End: 2020-08-21

## 2020-02-17 ASSESSMENT — ENCOUNTER SYMPTOMS
SORE THROAT: 0
EYE DISCHARGE: 0
FEVER: 0
FLANK PAIN: 0
ABDOMINAL PAIN: 1
NAUSEA: 1
COUGH: 0
VOMITING: 0
CHILLS: 1
SHORTNESS OF BREATH: 0
HEADACHES: 0
EYE REDNESS: 0

## 2020-02-18 DIAGNOSIS — Z02.89 ENCOUNTER FOR OCCUPATIONAL HEALTH EXAMINATION: ICD-10-CM

## 2020-02-18 DIAGNOSIS — R30.0 DYSURIA: ICD-10-CM

## 2020-02-18 NOTE — PROGRESS NOTES
Subjective:      Maribel Romo is a 44 y.o. female who presents with UTI (today )        Dysuria    This is a new problem. The current episode started today. The problem occurs every urination. The problem has been unchanged. The quality of the pain is described as burning. The pain is mild. There has been no fever. There is a history of pyelonephritis. Associated symptoms include chills, frequency, nausea and urgency. Pertinent negatives include no discharge, flank pain, hematuria or vomiting. Treatments tried: Cranberry Extract and Yeast Gaurd. The treatment provided mild relief. There is no history of kidney stones.     PMH:  has a past medical history of Anesthesia, Anxiety, and Depression.  MEDS:   Current Outpatient Medications:   •  ibuprofen (MOTRIN) 200 MG Tab, Take 200 mg by mouth every 6 hours as needed for Mild Pain., Disp: , Rfl:   •  Lidocaine HCl 3 % Cream, 1 Application by Apply externally route as needed (Apply's to hemorid). Apply's to hemorid, Disp: , Rfl:   •  polyethylene glycol/lytes (MIRALAX) Pack, Take 1 Packet by mouth every day., Disp: 30 Each, Rfl: 0  •  diphenhydrAMINE (BENADRYL) 25 MG Tab, Take 25 mg by mouth every 6 hours as needed for Sleep., Disp: , Rfl:   •  Multiple Vitamins-Minerals (MULTIVITAMIN PO), Take 1 Tab by mouth every day., Disp: , Rfl:   •  Naproxen Sodium (ALEVE) 220 MG Cap, Take 220-400 mg by mouth 2 times a day as needed. Indications: Pain, Disp: , Rfl:   ALLERGIES:   Allergies   Allergen Reactions   • Caffeine Palpitations   • Keflex Rash and Itching     ALL OVER BODY RASH, SCRATCHY THROAT     SURGHX:   Past Surgical History:   Procedure Laterality Date   • HEMORRHOIDECTOMY  9/11/2019    Procedure: HEMORRHOIDECTOMY - SINGLE QUADRANT;  Surgeon: Garcia Guzman M.D.;  Location: SURGERY [facility];  Service: General   • DILATION AND CURETTAGE     • PRIMARY C SECTION       SOCHX:  reports that she quit smoking about 15 years ago. She quit after 4.00 years  "of use. She has never used smokeless tobacco. She reports current alcohol use of about 1.8 oz of alcohol per week. She reports that she does not use drugs.  FH: Family history was reviewed, no pertinent findings to report      Review of Systems   Constitutional: Positive for chills. Negative for fever.   HENT: Negative for congestion, ear pain and sore throat.    Eyes: Negative for discharge and redness.   Respiratory: Negative for cough and shortness of breath.    Cardiovascular: Negative for chest pain and leg swelling.   Gastrointestinal: Positive for abdominal pain (suprapubic region) and nausea. Negative for vomiting.   Genitourinary: Positive for dysuria, frequency and urgency. Negative for flank pain and hematuria.   Musculoskeletal: Negative for joint pain.   Skin: Negative for rash.   Neurological: Negative for headaches.          Objective:     /60 (BP Location: Left arm, Patient Position: Sitting, BP Cuff Size: Adult)   Pulse 90   Temp 36.9 °C (98.4 °F) (Temporal)   Resp 18   Ht 1.702 m (5' 7\")   Wt 66.3 kg (146 lb 3.2 oz)   SpO2 99%   BMI 22.90 kg/m²      Physical Exam  Constitutional:       General: She is not in acute distress.     Appearance: Normal appearance. She is well-developed. She is not ill-appearing.   HENT:      Head: Normocephalic and atraumatic.      Right Ear: External ear normal.      Left Ear: External ear normal.      Nose: Nose normal.   Eyes:      Extraocular Movements: Extraocular movements intact.      Conjunctiva/sclera: Conjunctivae normal.   Neck:      Musculoskeletal: Normal range of motion and neck supple.   Cardiovascular:      Rate and Rhythm: Normal rate and regular rhythm.      Heart sounds: Normal heart sounds.   Pulmonary:      Effort: Pulmonary effort is normal. No respiratory distress.      Breath sounds: Normal breath sounds. No wheezing.   Abdominal:      Palpations: Abdomen is soft.      Tenderness: There is abdominal tenderness in the suprapubic area. " There is no right CVA tenderness or left CVA tenderness.   Musculoskeletal: Normal range of motion.   Skin:     General: Skin is warm and dry.   Neurological:      Mental Status: She is alert and oriented to person, place, and time.            Progress:  Results for orders placed or performed in visit on 02/17/20   POCT Urinalysis   Result Value Ref Range    POC Color Yellow Negative    POC Appearance Clear Negative    POC Leukocyte Esterase Large Negative    POC Nitrites Negative Negative    POC Urobiligen Negative Negative (0.2) mg/dL    POC Protein Negative Negative mg/dL    POC Urine PH 5.5 5.0 - 8.0    POC Blood Large Negative    POC Specific Gravity 1.005 <1.005 - >1.030    POC Ketones Negative Negative mg/dL    POC Bilirubin Negative Negative mg/dL    POC Glucose Negative Negative mg/dL       Urine Culture - pending     The patient is also requesting a prescription for Diflucan, as she has a history of antibiotic-induced yeast infections.  The patient reports no current vaginal discharge.     Assessment/Plan:     1. Dysuria  - POCT Urinalysis  - POCT PREGNANCY  - Urine Culture; Future  - phenazopyridine (PYRIDIUM) 100 MG Tab; Take 1 Tab by mouth 3 times a day as needed.  Dispense: 6 Tab; Refill: 0    2. Urinary tract infection with hematuria, site unspecified  - nitrofurantoin monohyd macro (MACROBID) 100 MG Cap; Take 1 Cap by mouth every 12 hours for 5 days.  Dispense: 10 Cap; Refill: 0    3. Antibiotic-induced yeast infection  - fluconazole (DIFLUCAN) 150 MG tablet; Take 1 Tab by mouth every day for 1 dose.  Dispense: 1 Tab; Refill: 0    Differential diagnoses, supportive care, and indications for immediate follow-up discussed with patient.   Instructed to return to clinic or nearest emergency department for any change in condition, further concerns, or worsening of symptoms.    OTC Tylenol or Motrin for fever/discomfort.  Drink plenty of fluids  Follow-up with PCP  Return to clinic or go to the ED if  symptoms worsen or fail to improve, or if the patient should develop worsening/increasing urinary symptoms, hematuria, flank pain, abdominal pain, nausea/vomiting, fever/chills, and/or any concerning symptoms.    Discussed plan with the patient, and she agrees to the above.     Please note that this dictation was created using voice recognition software. I have made every reasonable attempt to correct obvious errors, but I expect that there may be errors of grammar and possibly content that I did not discover before finalizing the note.

## 2020-02-21 LAB
BACTERIA UR CULT: ABNORMAL
BACTERIA UR CULT: ABNORMAL
SIGNIFICANT IND 70042: ABNORMAL
SITE SITE: ABNORMAL
SOURCE SOURCE: ABNORMAL

## 2020-04-13 ENCOUNTER — NON-PROVIDER VISIT (OUTPATIENT)
Dept: URGENT CARE | Facility: CLINIC | Age: 45
End: 2020-04-13

## 2020-04-13 ENCOUNTER — OFFICE VISIT (OUTPATIENT)
Dept: URGENT CARE | Facility: CLINIC | Age: 45
End: 2020-04-13

## 2020-04-13 ENCOUNTER — HOSPITAL ENCOUNTER (OUTPATIENT)
Facility: MEDICAL CENTER | Age: 45
End: 2020-04-13
Attending: NURSE PRACTITIONER
Payer: COMMERCIAL

## 2020-04-13 VITALS
HEIGHT: 67 IN | OXYGEN SATURATION: 97 % | RESPIRATION RATE: 12 BRPM | BODY MASS INDEX: 22.84 KG/M2 | HEART RATE: 94 BPM | TEMPERATURE: 100.3 F | SYSTOLIC BLOOD PRESSURE: 104 MMHG | WEIGHT: 145.5 LBS | DIASTOLIC BLOOD PRESSURE: 66 MMHG

## 2020-04-13 DIAGNOSIS — Z02.1 PRE-EMPLOYMENT HEALTH SCREENING EXAMINATION: ICD-10-CM

## 2020-04-13 DIAGNOSIS — Z02.89 ENCOUNTER FOR OCCUPATIONAL HEALTH EXAMINATION: Primary | ICD-10-CM

## 2020-04-13 DIAGNOSIS — Z02.1 PRE-EMPLOYMENT DRUG SCREENING: ICD-10-CM

## 2020-04-13 LAB
AMP AMPHETAMINE: NORMAL
BAR BARBITURATES: NORMAL
BZO BENZODIAZEPINES: NORMAL
COC COCAINE: NORMAL
INT CON NEG: NEGATIVE
INT CON POS: POSITIVE
MDMA ECSTASY: NORMAL
MET METHAMPHETAMINES: NORMAL
MTD METHADONE: NORMAL
OPI OPIATES: NORMAL
OXY OXYCODONE: NORMAL
PCP PHENCYCLIDINE: NORMAL
POC URINE DRUG SCREEN OCDRS: NORMAL
THC: NORMAL

## 2020-04-13 PROCEDURE — 94375 RESPIRATORY FLOW VOLUME LOOP: CPT | Performed by: NURSE PRACTITIONER

## 2020-04-13 PROCEDURE — 86765 RUBEOLA ANTIBODY: CPT | Performed by: NURSE PRACTITIONER

## 2020-04-13 PROCEDURE — 86787 VARICELLA-ZOSTER ANTIBODY: CPT | Performed by: NURSE PRACTITIONER

## 2020-04-13 PROCEDURE — 8915 PR COMPREHENSIVE PHYSICAL: Performed by: NURSE PRACTITIONER

## 2020-04-13 PROCEDURE — 90715 TDAP VACCINE 7 YRS/> IM: CPT | Performed by: NURSE PRACTITIONER

## 2020-04-13 PROCEDURE — 86735 MUMPS ANTIBODY: CPT | Performed by: NURSE PRACTITIONER

## 2020-04-13 PROCEDURE — 86762 RUBELLA ANTIBODY: CPT | Performed by: NURSE PRACTITIONER

## 2020-04-13 PROCEDURE — 86480 TB TEST CELL IMMUN MEASURE: CPT | Performed by: NURSE PRACTITIONER

## 2020-04-13 PROCEDURE — 80305 DRUG TEST PRSMV DIR OPT OBS: CPT | Performed by: NURSE PRACTITIONER

## 2020-04-13 SDOH — HEALTH STABILITY: MENTAL HEALTH: HOW OFTEN DO YOU HAVE A DRINK CONTAINING ALCOHOL?: 2-4 TIMES A MONTH

## 2020-04-15 LAB
GAMMA INTERFERON BACKGROUND BLD IA-ACNC: 0.02 IU/ML
M TB IFN-G BLD-IMP: NEGATIVE
M TB IFN-G CD4+ BCKGRND COR BLD-ACNC: 0.07 IU/ML
MITOGEN IGNF BCKGRD COR BLD-ACNC: >10 IU/ML
QFT TB2 - NIL TBQ2: 0.04 IU/ML

## 2020-04-16 LAB
MEV IGG SER IA-ACNC: 0.85
MUV IGG SER IA-ACNC: 1.38
RUBV AB SER QL: >500 IU/ML
VZV IGG SER IA-ACNC: 2.29

## 2020-04-17 ENCOUNTER — EH NON-PROVIDER (OUTPATIENT)
Dept: OCCUPATIONAL MEDICINE | Facility: CLINIC | Age: 45
End: 2020-04-17

## 2020-04-17 DIAGNOSIS — Z71.85 IMMUNIZATION COUNSELING: ICD-10-CM

## 2020-08-21 ENCOUNTER — OFFICE VISIT (OUTPATIENT)
Dept: URGENT CARE | Facility: PHYSICIAN GROUP | Age: 45
End: 2020-08-21
Payer: COMMERCIAL

## 2020-08-21 VITALS
BODY MASS INDEX: 21.98 KG/M2 | HEIGHT: 68 IN | TEMPERATURE: 98.7 F | WEIGHT: 145 LBS | DIASTOLIC BLOOD PRESSURE: 58 MMHG | HEART RATE: 92 BPM | OXYGEN SATURATION: 98 % | SYSTOLIC BLOOD PRESSURE: 100 MMHG

## 2020-08-21 DIAGNOSIS — R20.2 PARESTHESIA: ICD-10-CM

## 2020-08-21 PROCEDURE — 99214 OFFICE O/P EST MOD 30 MIN: CPT | Performed by: PHYSICIAN ASSISTANT

## 2020-08-21 ASSESSMENT — ENCOUNTER SYMPTOMS
NECK PAIN: 0
DIZZINESS: 0
FOCAL WEAKNESS: 0
WEAKNESS: 0
ANOREXIA: 0
SENSORY CHANGE: 0
DOUBLE VISION: 0
BLURRED VISION: 0
HEADACHES: 0
ARTHRALGIAS: 0
TREMORS: 0
FATIGUE: 0
TINGLING: 1
TINGLING: 1
ABDOMINAL PAIN: 0
SPEECH CHANGE: 0
FEVER: 0
SEIZURES: 0

## 2020-08-21 NOTE — PROGRESS NOTES
Subjective:   Maribel Romo is a 44 y.o. female who presents today with   Chief Complaint   Patient presents with   • Tingling     both hands left outer calf,on and off       Tingling  This is a new problem. The current episode started more than 1 year ago. The problem occurs constantly. The problem has been unchanged. Pertinent negatives include no abdominal pain, anorexia, arthralgias, fatigue, fever, headaches, neck pain or weakness. Associated symptoms comments: Back pain.     Patient's sister has Hashimoto's.  Patient states she has pins-and-needles sensation in her hands and also her left lower extremity to the front of her shin.  Patient states that after having an epidural she has always had issues with the left lower extremity with pain and pins-and-needles sensation.  Patient would like to rule out any potential premenopausal/vitamin deficiency or other etiology of her symptoms.  Patient states she also has some pain typically once a month around her menstrual cycle that starts in her back and causes some sciatic pain.  Patient states lately with her menstrual cycle she has had night sweats and more significant symptoms than what she is used to with her menstrual cycles.  Patient states the pins-and-needles in her hands has been new over the past couple of weeks and more consistent the past week.  Patient states her symptoms are worse with movement and activity.  She also endorses that she believes that she has Raynauds in the winter.  Patient denies any saddle anesthesia or bowel or bladder dysfunction at this time.  PMH:  has a past medical history of Anesthesia, Anxiety, and Depression.  MEDS:   Current Outpatient Medications:   •  ibuprofen (MOTRIN) 200 MG Tab, Take 200 mg by mouth every 6 hours as needed for Mild Pain., Disp: , Rfl:   •  diphenhydrAMINE (BENADRYL) 25 MG Tab, Take 25 mg by mouth every 6 hours as needed for Sleep., Disp: , Rfl:   •  Multiple Vitamins-Minerals  "(MULTIVITAMIN PO), Take 1 Tab by mouth every day., Disp: , Rfl:   ALLERGIES:   Allergies   Allergen Reactions   • Caffeine Palpitations   • Keflex Rash and Itching     ALL OVER BODY RASH, SCRATCHY THROAT     SURGHX:   Past Surgical History:   Procedure Laterality Date   • HEMORRHOIDECTOMY  9/11/2019    Procedure: HEMORRHOIDECTOMY - SINGLE QUADRANT;  Surgeon: Garcia Guzman M.D.;  Location: SURGERY [facility];  Service: General   • DILATION AND CURETTAGE     • PRIMARY C SECTION       SOCHX:  reports that she quit smoking about 15 years ago. She quit after 4.00 years of use. She has never used smokeless tobacco. She reports current alcohol use of about 1.8 oz of alcohol per week. She reports that she does not use drugs.  FH: Reviewed with patient, not pertinent to this visit.       Review of Systems   Constitutional: Negative for fatigue and fever.   Eyes: Negative for blurred vision and double vision.   Gastrointestinal: Negative for abdominal pain and anorexia.   Musculoskeletal: Negative for arthralgias and neck pain.   Neurological: Positive for tingling. Negative for dizziness, tremors, sensory change, speech change, focal weakness, seizures, weakness and headaches.   All other systems reviewed and are negative.       Objective:   /58 (Patient Position: Sitting, BP Cuff Size: Adult)   Pulse 92   Temp 37.1 °C (98.7 °F) (Temporal)   Ht 1.727 m (5' 8\")   Wt 65.8 kg (145 lb)   SpO2 98%   BMI 22.05 kg/m²   Physical Exam  Vitals signs and nursing note reviewed.   Constitutional:       General: She is not in acute distress.     Appearance: Normal appearance. She is well-developed. She is not ill-appearing or toxic-appearing.   HENT:      Head: Normocephalic and atraumatic.      Right Ear: Hearing normal.      Left Ear: Hearing normal.   Eyes:      Conjunctiva/sclera: Conjunctivae normal.   Neck:      Musculoskeletal: No neck rigidity.   Cardiovascular:      Rate and Rhythm: Normal rate and regular " rhythm.      Heart sounds: Normal heart sounds.   Pulmonary:      Effort: Pulmonary effort is normal.      Breath sounds: Normal breath sounds. No wheezing, rhonchi or rales.   Musculoskeletal:      Lumbar back: She exhibits tenderness and swelling. She exhibits no bony tenderness and no spasm.        Back:         Legs:       Comments: Normal movement in all 4 extremities   Skin:     General: Skin is warm and dry.   Neurological:      General: No focal deficit present.      Mental Status: She is alert.      Sensory: No sensory deficit.      Motor: Motor function is intact. No weakness.      Coordination: Coordination is intact. Coordination normal.      Gait: Gait is intact.   Psychiatric:         Mood and Affect: Mood normal.       Unable to reproduce exact symptoms upon exam.     Assessment/Plan:   Assessment    1. Paresthesia  - TSH WITH REFLEX TO FT4; Future  - CBC WITH DIFFERENTIAL; Future  - Comp Metabolic Panel; Future  - FSH/LH; Future  - ESTROGENS FRACTIONATED; Future  - VITAMIN B12; Future  - VITAMIN D,25 HYDROXY; Future  - REFERRAL TO NEUROLOGY  Will start with blood work today and patient will follow-up with neurology per her request.  Also encouraged follow-up with primary care for any potential need for further testing.  Given patient's intermittent back pain suspect likely peripheral radiculopathy as cause of her symptoms but did discuss other potential differentials as well.  Offered physical therapy as well today but patient will wait to see what neurology follow-up has to say.  Discussed red flag signs with patient that would require her to report to the emergency room for immediate evaluation if they occur.  Differential diagnosis, natural history, supportive care, and indications for immediate follow-up discussed.   Patient given instructions and understanding of medications and treatment.    If not improving in 3-5 days, F/U with PCP or return to  if symptoms worsen.    Patient agreeable to  plan.      Please note that this dictation was created using voice recognition software. I have made every reasonable attempt to correct obvious errors, but I expect that there are errors of grammar and possibly content that I did not discover before finalizing the note.    Clinton Dorado PA-C

## 2020-08-22 ENCOUNTER — HOSPITAL ENCOUNTER (OUTPATIENT)
Dept: LAB | Facility: MEDICAL CENTER | Age: 45
End: 2020-08-22
Attending: PHYSICIAN ASSISTANT
Payer: COMMERCIAL

## 2020-08-22 DIAGNOSIS — R20.2 PARESTHESIA: ICD-10-CM

## 2020-08-22 LAB
25(OH)D3 SERPL-MCNC: 39 NG/ML (ref 30–100)
ALBUMIN SERPL BCP-MCNC: 4.6 G/DL (ref 3.2–4.9)
ALBUMIN/GLOB SERPL: 1.9 G/DL
ALP SERPL-CCNC: 53 U/L (ref 30–99)
ALT SERPL-CCNC: 17 U/L (ref 2–50)
ANION GAP SERPL CALC-SCNC: 10 MMOL/L (ref 7–16)
AST SERPL-CCNC: 14 U/L (ref 12–45)
BASOPHILS # BLD AUTO: 0.9 % (ref 0–1.8)
BASOPHILS # BLD: 0.04 K/UL (ref 0–0.12)
BILIRUB SERPL-MCNC: 0.9 MG/DL (ref 0.1–1.5)
BUN SERPL-MCNC: 13 MG/DL (ref 8–22)
CALCIUM SERPL-MCNC: 9.4 MG/DL (ref 8.5–10.5)
CHLORIDE SERPL-SCNC: 105 MMOL/L (ref 96–112)
CO2 SERPL-SCNC: 25 MMOL/L (ref 20–33)
CREAT SERPL-MCNC: 0.84 MG/DL (ref 0.5–1.4)
EOSINOPHIL # BLD AUTO: 0.04 K/UL (ref 0–0.51)
EOSINOPHIL NFR BLD: 0.9 % (ref 0–6.9)
ERYTHROCYTE [DISTWIDTH] IN BLOOD BY AUTOMATED COUNT: 42.1 FL (ref 35.9–50)
FSH SERPL-ACNC: 7.2 MIU/ML
GLOBULIN SER CALC-MCNC: 2.4 G/DL (ref 1.9–3.5)
GLUCOSE SERPL-MCNC: 91 MG/DL (ref 65–99)
HCT VFR BLD AUTO: 44.1 % (ref 37–47)
HGB BLD-MCNC: 14.4 G/DL (ref 12–16)
IMM GRANULOCYTES # BLD AUTO: 0.01 K/UL (ref 0–0.11)
IMM GRANULOCYTES NFR BLD AUTO: 0.2 % (ref 0–0.9)
LH SERPL-ACNC: 10.1 IU/L
LYMPHOCYTES # BLD AUTO: 1.33 K/UL (ref 1–4.8)
LYMPHOCYTES NFR BLD: 30.2 % (ref 22–41)
MCH RBC QN AUTO: 29.6 PG (ref 27–33)
MCHC RBC AUTO-ENTMCNC: 32.7 G/DL (ref 33.6–35)
MCV RBC AUTO: 90.6 FL (ref 81.4–97.8)
MONOCYTES # BLD AUTO: 0.32 K/UL (ref 0–0.85)
MONOCYTES NFR BLD AUTO: 7.3 % (ref 0–13.4)
NEUTROPHILS # BLD AUTO: 2.66 K/UL (ref 2–7.15)
NEUTROPHILS NFR BLD: 60.5 % (ref 44–72)
NRBC # BLD AUTO: 0 K/UL
NRBC BLD-RTO: 0 /100 WBC
PLATELET # BLD AUTO: 213 K/UL (ref 164–446)
PMV BLD AUTO: 10.5 FL (ref 9–12.9)
POTASSIUM SERPL-SCNC: 5 MMOL/L (ref 3.6–5.5)
PROT SERPL-MCNC: 7 G/DL (ref 6–8.2)
RBC # BLD AUTO: 4.87 M/UL (ref 4.2–5.4)
SODIUM SERPL-SCNC: 140 MMOL/L (ref 135–145)
TSH SERPL DL<=0.005 MIU/L-ACNC: 2.79 UIU/ML (ref 0.38–5.33)
VIT B12 SERPL-MCNC: 520 PG/ML (ref 211–911)
WBC # BLD AUTO: 4.4 K/UL (ref 4.8–10.8)

## 2020-08-22 PROCEDURE — 82671 ASSAY OF ESTROGENS: CPT

## 2020-08-22 PROCEDURE — 36415 COLL VENOUS BLD VENIPUNCTURE: CPT

## 2020-08-22 PROCEDURE — 82607 VITAMIN B-12: CPT

## 2020-08-22 PROCEDURE — 85025 COMPLETE CBC W/AUTO DIFF WBC: CPT

## 2020-08-22 PROCEDURE — 82306 VITAMIN D 25 HYDROXY: CPT

## 2020-08-22 PROCEDURE — 83001 ASSAY OF GONADOTROPIN (FSH): CPT

## 2020-08-22 PROCEDURE — 84443 ASSAY THYROID STIM HORMONE: CPT

## 2020-08-22 PROCEDURE — 83002 ASSAY OF GONADOTROPIN (LH): CPT

## 2020-08-22 PROCEDURE — 80053 COMPREHEN METABOLIC PANEL: CPT

## 2020-08-25 LAB
ESTRADIOL SERPL HS-MCNC: 82.7 PG/ML
ESTROGEN SERPL CALC-MCNC: 141.5 PG/ML
ESTRONE SERPL-MCNC: 58.8 PG/ML

## 2020-11-24 ENCOUNTER — OFFICE VISIT (OUTPATIENT)
Dept: MEDICAL GROUP | Facility: PHYSICIAN GROUP | Age: 45
End: 2020-11-24
Payer: COMMERCIAL

## 2020-11-24 ENCOUNTER — HOSPITAL ENCOUNTER (OUTPATIENT)
Facility: MEDICAL CENTER | Age: 45
End: 2020-11-24
Attending: NURSE PRACTITIONER
Payer: COMMERCIAL

## 2020-11-24 VITALS
WEIGHT: 149 LBS | HEIGHT: 68 IN | BODY MASS INDEX: 22.58 KG/M2 | HEART RATE: 85 BPM | DIASTOLIC BLOOD PRESSURE: 50 MMHG | OXYGEN SATURATION: 99 % | SYSTOLIC BLOOD PRESSURE: 96 MMHG | TEMPERATURE: 98.9 F

## 2020-11-24 DIAGNOSIS — Z01.419 WELL WOMAN EXAM WITH ROUTINE GYNECOLOGICAL EXAM: ICD-10-CM

## 2020-11-24 DIAGNOSIS — Z12.31 ENCOUNTER FOR SCREENING MAMMOGRAM FOR MALIGNANT NEOPLASM OF BREAST: ICD-10-CM

## 2020-11-24 DIAGNOSIS — Z12.4 SCREENING FOR CERVICAL CANCER: ICD-10-CM

## 2020-11-24 PROCEDURE — 99000 SPECIMEN HANDLING OFFICE-LAB: CPT | Performed by: NURSE PRACTITIONER

## 2020-11-24 PROCEDURE — 99396 PREV VISIT EST AGE 40-64: CPT | Performed by: NURSE PRACTITIONER

## 2020-11-24 PROCEDURE — 87624 HPV HI-RISK TYP POOLED RSLT: CPT

## 2020-11-24 PROCEDURE — 88175 CYTOPATH C/V AUTO FLUID REDO: CPT

## 2020-11-24 ASSESSMENT — FIBROSIS 4 INDEX: FIB4 SCORE: 0.69

## 2020-11-24 ASSESSMENT — PATIENT HEALTH QUESTIONNAIRE - PHQ9: CLINICAL INTERPRETATION OF PHQ2 SCORE: 0

## 2020-11-24 NOTE — PROGRESS NOTES
CC:  Pap/Well Woman Exam    History of present illness:  Maribel Romo is 44 y.o.white female presenting today for well woman exam with gynecological exam and Pap smear.   She reports her periods are regular.  She is currently using  condoms as birth control. Diet 3/week. Exercise healthy diet.    Well woman exam with routine gynecological exam  Here for well woman.  Reports some new breast tenderness, no lumps or discharge.  Tenderness not always associated with period, in the past it has correlated.  Reports exercise 3/week.    Diet is reported healthy, no soda.  Reports more alcohol.  No caffeine.   Mother had fibrocystic disease.  No vaginal pain, lesions, odor reported.  Has had some hemorrhoid surgery/fissure reported.  Recent constipation, reports some granular tissue recently passed.  No blood, some rectal bleeding occasionally.        Past Medical History:   Diagnosis Date   • Anesthesia     hypotension with epidural   • Anxiety    • Depression     anxiety       Past Surgical History:   Procedure Laterality Date   • HEMORRHOIDECTOMY  9/11/2019    Procedure: HEMORRHOIDECTOMY - SINGLE QUADRANT;  Surgeon: Garcia Guzman M.D.;  Location: SURGERY [facility];  Service: General   • DILATION AND CURETTAGE     • PRIMARY C SECTION         Outpatient Encounter Medications as of 11/24/2020   Medication Sig Dispense Refill   • Multiple Vitamins-Minerals (ZINC PO) Take  by mouth.     • ibuprofen (MOTRIN) 200 MG Tab Take 200 mg by mouth every 6 hours as needed for Mild Pain.     • diphenhydrAMINE (BENADRYL) 25 MG Tab Take 25 mg by mouth every 6 hours as needed for Sleep.     • Multiple Vitamins-Minerals (MULTIVITAMIN PO) Take 1 Tab by mouth every day.       No facility-administered encounter medications on file as of 11/24/2020.        Patient Active Problem List    Diagnosis Date Noted   • Well woman exam with routine gynecological exam 08/13/2018   • Heart palpitations 08/29/2017   • History of HPV  infection 2017   • Photosensitivity dermatitis 2017       .  Social History     Socioeconomic History   • Marital status:      Spouse name: Not on file   • Number of children: Not on file   • Years of education: Not on file   • Highest education level: Not on file   Occupational History   • Not on file   Social Needs   • Financial resource strain: Not on file   • Food insecurity     Worry: Not on file     Inability: Not on file   • Transportation needs     Medical: Not on file     Non-medical: Not on file   Tobacco Use   • Smoking status: Former Smoker     Years: 4.00     Quit date: 2004     Years since quittin.2   • Smokeless tobacco: Never Used   Substance and Sexual Activity   • Alcohol use: Yes     Alcohol/week: 1.8 oz     Types: 3 Glasses of wine per week     Frequency: 2-4 times a month     Binge frequency: Never   • Drug use: No   • Sexual activity: Yes     Partners: Male     Birth control/protection: Condom   Lifestyle   • Physical activity     Days per week: Not on file     Minutes per session: Not on file   • Stress: Not on file   Relationships   • Social connections     Talks on phone: Not on file     Gets together: Not on file     Attends [employer] service: Not on file     Active member of club or organization: Not on file     Attends meetings of clubs or organizations: Not on file     Relationship status: Not on file   • Intimate partner violence     Fear of current or ex partner: Not on file     Emotionally abused: Not on file     Physically abused: Not on file     Forced sexual activity: Not on file   Other Topics Concern   • Not on file   Social History Narrative   • Not on file       Family History   Problem Relation Age of Onset   • No Known Problems Mother    • No Known Problems Father    • No Known Problems Sister          ROS:  Denies Weight loss, fatigue, chest pain, SOB, bowel or bladder changes. No significant dysmenorrhea, concerning vaginal discharge or  "irritation, no dyspareunia or postcoital bleeding. Denies h/o migraine with aura. Denies musculoskeletal, neurological, or psychiatric problems.      BP (!) 96/50 (BP Location: Left arm, Patient Position: Sitting)   Pulse 85   Temp 37.2 °C (98.9 °F) (Temporal)   Ht 1.721 m (5' 7.75\")   Wt 67.6 kg (149 lb)   LMP 11/03/2020 (Approximate)   SpO2 99%   BMI 22.82 kg/m²     GEN:  Appears well and in no apparent distress   NECK:  Supple without adenopathy or thyromegaly  LUNGS:  Clear and equal. No wheeze, ronchi, or rales.  CV:  RRR, S1, S2. No murmur.  Pedal pulses 2+ bilaterally.  BREAST:  Symmetrical without masses. No nipple discharge.  ABD:  Soft, non-tender, non-distended, normal bowel sounds.  No hepatosplenomegaly.  :  Normal external female genitalia.  Vaginal canal clear.  Cervix appears normal. Specimen collected from transformation zone. Bimanual exam:  No CMT, normal size uterus without masses or tenderness; no adnexal masses or tenderness. External non-inflammed hemorrhoids noted on exam.       Assessment and plan    1. Well woman exam with routine gynecological exam  Here for annual exam.  PAP obtained.  Up to date on vaccines.  Mammogram ordered.      2. Screening for cervical cancer  Pap obtained. Sent to pathology.  Monitor and follow  - THINPREP PAP WITH HPV; Future    3. Encounter for screening mammogram for malignant neoplasm of breast  Due for mammo.  Order provided.   - MA-SCREENING MAMMO BILAT W/CAD; Future      F/u pending results    A chaperone was offered to the patient during today's exam. Chaperone name: michael lares RN was present. woman    "

## 2020-11-24 NOTE — ASSESSMENT & PLAN NOTE
Here for well woman.  Reports some new breast tenderness, no lumps or discharge.  Tenderness not always associated with period, in the past it has correlated.  Reports exercise 3/week.    Diet is reported healthy, no soda.  Reports more alcohol.  No caffeine.   Mother had fibrocystic disease.  No vaginal pain, lesions, odor reported.  Has had some hemorrhoid surgery/fissure reported.  Recent constipation, reports some granular tissue recently passed.  No blood, some rectal bleeding occasionally.

## 2020-11-25 DIAGNOSIS — Z12.4 SCREENING FOR CERVICAL CANCER: ICD-10-CM

## 2021-03-27 ENCOUNTER — PATIENT MESSAGE (OUTPATIENT)
Dept: MEDICAL GROUP | Facility: PHYSICIAN GROUP | Age: 46
End: 2021-03-27

## 2021-03-27 DIAGNOSIS — N64.4 PAIN OF RIGHT BREAST: ICD-10-CM

## 2021-03-30 ENCOUNTER — HOSPITAL ENCOUNTER (OUTPATIENT)
Dept: RADIOLOGY | Facility: MEDICAL CENTER | Age: 46
End: 2021-03-30
Attending: NURSE PRACTITIONER
Payer: COMMERCIAL

## 2021-03-30 DIAGNOSIS — Z12.31 ENCOUNTER FOR SCREENING MAMMOGRAM FOR MALIGNANT NEOPLASM OF BREAST: ICD-10-CM

## 2021-03-30 PROCEDURE — 77063 BREAST TOMOSYNTHESIS BI: CPT

## 2021-08-02 ENCOUNTER — OFFICE VISIT (OUTPATIENT)
Dept: MEDICAL GROUP | Facility: PHYSICIAN GROUP | Age: 46
End: 2021-08-02
Payer: COMMERCIAL

## 2021-08-02 VITALS
SYSTOLIC BLOOD PRESSURE: 110 MMHG | WEIGHT: 149 LBS | HEIGHT: 68 IN | DIASTOLIC BLOOD PRESSURE: 50 MMHG | TEMPERATURE: 98.6 F | BODY MASS INDEX: 22.58 KG/M2 | OXYGEN SATURATION: 99 % | HEART RATE: 86 BPM

## 2021-08-02 DIAGNOSIS — Z30.2 ENCOUNTER FOR STERILIZATION: ICD-10-CM

## 2021-08-02 DIAGNOSIS — R22.2 LUMP OF SKIN OF BACK: ICD-10-CM

## 2021-08-02 PROCEDURE — 99212 OFFICE O/P EST SF 10 MIN: CPT | Performed by: NURSE PRACTITIONER

## 2021-08-02 ASSESSMENT — PATIENT HEALTH QUESTIONNAIRE - PHQ9: CLINICAL INTERPRETATION OF PHQ2 SCORE: 0

## 2021-08-02 ASSESSMENT — FIBROSIS 4 INDEX: FIB4 SCORE: 0.7

## 2021-08-02 NOTE — PROGRESS NOTES
"Chief Complaint   Patient presents with   • Lump     possible cysts on low back area        Subjective:   Maribel Romo is a 45 y.o. female here today for evaluation and management of:    Encounter for sterilization  Patient requesting referral to GYN for consult regarding tubal ligation.  She is wanting permanent birth control.  Referral placed.      Lump of skin of back  Patient presents today with concerns over two small bumps on her lower back.  One on the right upper gluteal area and one on the lower lumbar spine.  She feels like these are ganglion cysts as her mother has these and she has a small on on her right wrist.  Reports that these lumps have not grown.  Have been there about 6 months.  No redness, inflammation reported.  Some tenderness with massage and seem to get more tender with period.             Current medicines (including changes today)  Current Outpatient Medications   Medication Sig Dispense Refill   • Multiple Vitamins-Minerals (ZINC PO) Take  by mouth.     • ibuprofen (MOTRIN) 200 MG Tab Take 200 mg by mouth every 6 hours as needed for Mild Pain.     • diphenhydrAMINE (BENADRYL) 25 MG Tab Take 25 mg by mouth every 6 hours as needed for Sleep.     • Multiple Vitamins-Minerals (MULTIVITAMIN PO) Take 1 Tab by mouth every day.       No current facility-administered medications for this visit.     She  has a past medical history of Anesthesia, Anxiety, and Depression.    ROS as stated in hpi  No chest pain, no shortness of breath, no abdominal pain       Objective:     /50 (BP Location: Left arm, Patient Position: Sitting)   Pulse 86   Temp 37 °C (98.6 °F) (Temporal)   Ht 1.721 m (5' 7.75\")   Wt 67.6 kg (149 lb)   SpO2 99%  Body mass index is 22.82 kg/m².   Physical Exam:  Constitutional: Alert, no distress.  Skin: Warm, dry, good turgor,no cyanosis, no rashes in visible areas. Right upper gluteus with pea sized, movable cyst like lesion.  Lower lumbar spine with " movable, pea sized lesion noted.  No redness, tenderness on palpation.  Eye: Equal, round and reactive, conjunctiva clear, lids normal.  Neuro: Cranial nerves 2-12 grossly intact.  No sensory deficit.  Respiratory: Unlabored respiratory effort,Psych: Alert and oriented x3, normal affect and mood and judgement.        Assessment and Plan:   The following treatment plan was discussed    1. Encounter for sterilization  Here today for referral to GYN for tubal ligation.  Referral placed.  Monitor.   - REFERRAL TO GYNECOLOGY    2. Lump of skin of back  Acute, uncomplicated issue.  This could be a cyst, lipoma.  No growth or concerns today.  Discussed that if these lesions grow, more pain, would consider ultrasound.  Reassured.  Monitor and follow.       Followup: No follow-ups on file.         Educated in proper administration of medication(s) ordered today including safety, possible SE, risks, benefits, rationale and alternatives to therapy.     Please note that this dictation was created using voice recognition software. I have made every reasonable attempt to correct obvious errors, but I expect that there are errors of grammar and possibly content that I did not discover before finalizing the note.

## 2021-08-02 NOTE — ASSESSMENT & PLAN NOTE
Patient presents today with concerns over two small bumps on her lower back.  One on the right upper gluteal area and one on the lower lumbar spine.  She feels like these are ganglion cysts as her mother has these and she has a small on on her right wrist.  Reports that these lumps have not grown.  Have been there about 6 months.  No redness, inflammation reported.  Some tenderness with massage and seem to get more tender with period.    
Patient requesting referral to GYN for consult regarding tubal ligation.  She is wanting permanent birth control.  Referral placed.    
22-Nov-2018

## 2021-09-09 ENCOUNTER — HOSPITAL ENCOUNTER (OUTPATIENT)
Dept: RADIOLOGY | Facility: MEDICAL CENTER | Age: 46
End: 2021-09-09
Attending: NURSE PRACTITIONER
Payer: COMMERCIAL

## 2021-09-09 ENCOUNTER — OFFICE VISIT (OUTPATIENT)
Dept: MEDICAL GROUP | Facility: PHYSICIAN GROUP | Age: 46
End: 2021-09-09
Payer: COMMERCIAL

## 2021-09-09 VITALS
BODY MASS INDEX: 21.98 KG/M2 | HEIGHT: 68 IN | DIASTOLIC BLOOD PRESSURE: 56 MMHG | HEART RATE: 86 BPM | SYSTOLIC BLOOD PRESSURE: 110 MMHG | OXYGEN SATURATION: 99 % | TEMPERATURE: 99.7 F | RESPIRATION RATE: 16 BRPM | WEIGHT: 145 LBS

## 2021-09-09 DIAGNOSIS — M79.672 LEFT FOOT PAIN: ICD-10-CM

## 2021-09-09 PROCEDURE — 73630 X-RAY EXAM OF FOOT: CPT | Mod: LT

## 2021-09-09 PROCEDURE — 99213 OFFICE O/P EST LOW 20 MIN: CPT | Performed by: NURSE PRACTITIONER

## 2021-09-09 ASSESSMENT — FIBROSIS 4 INDEX: FIB4 SCORE: 0.7

## 2021-09-09 NOTE — ASSESSMENT & PLAN NOTE
Patient tripped over a obstacle in garage and hit top outer portion of left foot.  Immediate pain, bruising.  Patient applied ice. This occurred 4 hours ago.  Still throbbing in pain.  Will xray

## 2021-09-09 NOTE — PROGRESS NOTES
"Chief Complaint   Patient presents with   • Foot Injury     left foot-today        Subjective:   Maribel Romo is a 45 y.o. female here today for evaluation and management of:    Left foot pain  Patient tripped over a obstacle in garage and hit top outer portion of left foot.  Immediate pain, bruising.  Patient applied ice. This occurred 4 hours ago.  Still throbbing in pain.  Will xray         Current medicines (including changes today)  Current Outpatient Medications   Medication Sig Dispense Refill   • ibuprofen (MOTRIN) 200 MG Tab Take 200 mg by mouth every 6 hours as needed for Mild Pain.     • diphenhydrAMINE (BENADRYL) 25 MG Tab Take 25 mg by mouth every 6 hours as needed for Sleep.     • Multiple Vitamins-Minerals (MULTIVITAMIN PO) Take 1 Tab by mouth every day.       No current facility-administered medications for this visit.     She  has a past medical history of Anesthesia, Anxiety, and Depression.    ROS as stated inhpi  No chest pain, no shortness of breath, no abdominal pain       Objective:     /56 (BP Location: Left arm, Patient Position: Sitting)   Pulse 86   Temp 37.6 °C (99.7 °F) (Temporal)   Resp 16   Ht 1.715 m (5' 7.5\")   Wt 65.8 kg (145 lb)   SpO2 99%  Body mass index is 22.38 kg/m².   Physical Exam:  Constitutional: Alert, no distress.  Skin: Warm, dry, good turgor,no cyanosis, no rashes in visible areas.  Eye: Equal, round and reactive, conjunctiva clear, lids normal.  MSK:  Left top of foot with obvious swelling and bruising.  Skin intact.  Painful to touch or move.  No numbness reported.   Neuro: Cranial nerves 2-12 grossly intact.  No sensory deficit.  Respiratory: Unlabored respiratory effort,Psych: Alert and oriented x3, normal affect and mood and judgement.        Assessment and Plan:   The following treatment plan was discussed    1. Left foot pain  Acute, complex issue.  Xray to rule out fracture with blunt injury.  Ice, elevate, compression, ibuprofen.  If " fracture/non displaced will consider boot.  If displaced will refer to sport med to follow.  Monitor and follow.   - DX-FOOT-COMPLETE 3+ LEFT; Future      Followup: No follow-ups on file.         Educated in proper administration of medication(s) ordered today including safety, possible SE, risks, benefits, rationale and alternatives to therapy.     Please note that this dictation was created using voice recognition software. I have made every reasonable attempt to correct obvious errors, but I expect that there are errors of grammar and possibly content that I did not discover before finalizing the note.

## 2021-11-02 ENCOUNTER — TELEMEDICINE (OUTPATIENT)
Dept: TELEHEALTH | Facility: TELEMEDICINE | Age: 46
End: 2021-11-02
Payer: COMMERCIAL

## 2021-11-02 DIAGNOSIS — H18.891 CORNEAL IRRITATION OF RIGHT EYE: ICD-10-CM

## 2021-11-02 DIAGNOSIS — H10.31 ACUTE BACTERIAL CONJUNCTIVITIS OF RIGHT EYE: ICD-10-CM

## 2021-11-02 PROCEDURE — 99213 OFFICE O/P EST LOW 20 MIN: CPT | Mod: 95 | Performed by: NURSE PRACTITIONER

## 2021-11-02 RX ORDER — CIPROFLOXACIN HYDROCHLORIDE 3.5 MG/ML
SOLUTION/ DROPS TOPICAL
Qty: 10 ML | Refills: 0 | Status: SHIPPED | OUTPATIENT
Start: 2021-11-02 | End: 2022-02-16

## 2021-11-02 ASSESSMENT — ENCOUNTER SYMPTOMS
DOUBLE VISION: 0
EYE DISCHARGE: 1
FEVER: 0
EYE PAIN: 1
BLURRED VISION: 1
EYE REDNESS: 1
CHILLS: 0
PHOTOPHOBIA: 1

## 2021-11-02 NOTE — PROGRESS NOTES
Subjective     Maribel Romo is a 45 y.o. female who presents with No chief complaint on file.            Maribel presents for a virtual visit in [place].  Identification was verified.  Patient was informed that encounter would be conducted over [employer], a secure, encrypted network and consent was obtained.  She reports that 3 days ago she was raking leaves.  She feels like she got debris in her right eye and since then has had a red, painful eye with profuse watering and also new onset matting purulence today.  She reports her eye feels scratched.  She has inspected the eye and denies any retained foreign body.  She notes mild photophobia and mild blurry vision.  She does not wear contact lenses.  Eye irrigation has provided no relief.        Review of Systems   Constitutional: Negative for chills, fever and malaise/fatigue.   Eyes: Positive for blurred vision, photophobia, pain, discharge and redness. Negative for double vision.     Medications, Allergies, and current problem list reviewed today in Epic         Objective     There were no vitals taken for this visit.     Physical Exam  Constitutional:       General: She is not in acute distress.     Appearance: Normal appearance. She is not ill-appearing or toxic-appearing.   HENT:      Nose: Nose normal.   Eyes:      General: No scleral icterus.        Right eye: Discharge present.      Extraocular Movements: Extraocular movements intact.      Pupils: Pupils are equal, round, and reactive to light.      Comments: Right conjunctiva is diffusely injected.  Watery eye drainage noted.  No evidence of trauma or foreign body.  No hyphema or hypopyon.  No periorbital pain, swelling, erythema, rash or lesion.   Pulmonary:      Effort: Pulmonary effort is normal.   Skin:     Coloration: Skin is not jaundiced or pale.   Neurological:      Mental Status: She is alert and oriented to person, place, and time.   Psychiatric:         Mood and Affect: Mood normal.                              Assessment & Plan        1. Acute bacterial conjunctivitis of right eye  - ciprofloxacin (CILOXIN) 0.3 % Solution; 1 drop to the right eye every 2 hours while awake on days 1-2, then 1 drop to right eye every 4 hours while awake on days 3-7.  Dispense: 10 mL; Refill: 0    2. Corneal irritation of right eye  - ciprofloxacin (CILOXIN) 0.3 % Solution; 1 drop to the right eye every 2 hours while awake on days 1-2, then 1 drop to right eye every 4 hours while awake on days 3-7.  Dispense: 10 mL; Refill: 0    Discussed exam findings with Maribel.  Differential reviewed.  Exam limited by nature of telemed visit.  Cannot exclude corneal abrasion/ulcer.  Discussed with patient that conjunctivitis can be bacterial, viral, or allergic.  Will treat presuming a bacterial infection with antibiotic coverage for possible corneal abrasion as well.  Clean any crusting or matting from eyes with a warm cloth and launder cloth before reuse.  Maintain adequate po hydration.  Follow up with optometry/ophthalmology 2-3 days if symptoms do not improve, sooner if worse.  ED precautions reviewed.  She verbalized understanding of and agreed with plan of care.

## 2022-02-16 ENCOUNTER — OFFICE VISIT (OUTPATIENT)
Dept: MEDICAL GROUP | Facility: PHYSICIAN GROUP | Age: 47
End: 2022-02-16
Payer: COMMERCIAL

## 2022-02-16 VITALS
DIASTOLIC BLOOD PRESSURE: 54 MMHG | HEIGHT: 68 IN | SYSTOLIC BLOOD PRESSURE: 104 MMHG | HEART RATE: 84 BPM | BODY MASS INDEX: 22.28 KG/M2 | WEIGHT: 147 LBS | OXYGEN SATURATION: 97 % | TEMPERATURE: 98.8 F

## 2022-02-16 DIAGNOSIS — Z76.89 ENCOUNTER TO ESTABLISH CARE: ICD-10-CM

## 2022-02-16 DIAGNOSIS — Z01.818 TUBAL LIGATION EVALUATION: ICD-10-CM

## 2022-02-16 DIAGNOSIS — F41.9 ANXIETY: ICD-10-CM

## 2022-02-16 DIAGNOSIS — Z12.31 BREAST CANCER SCREENING BY MAMMOGRAM: ICD-10-CM

## 2022-02-16 DIAGNOSIS — Z30.2 ENCOUNTER FOR STERILIZATION: ICD-10-CM

## 2022-02-16 PROCEDURE — 99213 OFFICE O/P EST LOW 20 MIN: CPT | Performed by: NURSE PRACTITIONER

## 2022-02-16 RX ORDER — HYDROXYZINE HYDROCHLORIDE 25 MG/1
25 TABLET, FILM COATED ORAL 2 TIMES DAILY PRN
Qty: 60 TABLET | Refills: 0 | Status: SHIPPED | OUTPATIENT
Start: 2022-02-16 | End: 2022-03-14

## 2022-02-16 ASSESSMENT — ANXIETY QUESTIONNAIRES
4. TROUBLE RELAXING: SEVERAL DAYS
GAD7 TOTAL SCORE: 8
5. BEING SO RESTLESS THAT IT IS HARD TO SIT STILL: SEVERAL DAYS
6. BECOMING EASILY ANNOYED OR IRRITABLE: SEVERAL DAYS
3. WORRYING TOO MUCH ABOUT DIFFERENT THINGS: SEVERAL DAYS
2. NOT BEING ABLE TO STOP OR CONTROL WORRYING: SEVERAL DAYS
7. FEELING AFRAID AS IF SOMETHING AWFUL MIGHT HAPPEN: SEVERAL DAYS
1. FEELING NERVOUS, ANXIOUS, OR ON EDGE: MORE THAN HALF THE DAYS

## 2022-02-16 ASSESSMENT — ENCOUNTER SYMPTOMS
SHORTNESS OF BREATH: 0
FEVER: 0
COUGH: 0
GASTROINTESTINAL NEGATIVE: 1
MUSCULOSKELETAL NEGATIVE: 1
SPUTUM PRODUCTION: 0
PSYCHIATRIC NEGATIVE: 1
PALPITATIONS: 0
EYES NEGATIVE: 1
CONSTITUTIONAL NEGATIVE: 1
NEUROLOGICAL NEGATIVE: 1

## 2022-02-16 ASSESSMENT — PATIENT HEALTH QUESTIONNAIRE - PHQ9: CLINICAL INTERPRETATION OF PHQ2 SCORE: 0

## 2022-02-16 ASSESSMENT — FIBROSIS 4 INDEX: FIB4 SCORE: 0.72

## 2022-02-16 NOTE — PROGRESS NOTES
Subjective:     CC:    Chief Complaint   Patient presents with   • Establish Care        HISTORY OF THE PRESENT ILLNESS: Patient is a 46 y.o. female, here today to establish care. Prior PCP was Michell Reno. The below problems were discussed/reviewed at this visit:    Problem   Anxiety    States she has excessive sweating related to anxiety; hx postpartum depression after all 4 pregnancies; was on celexa and stopped about 6 years ago after last child; currently using benadryl about 5 nights each week for anxiety related insomnia     Encounter for Sterilization   Lump of Skin of Back    Notices small soft cyst on right lower back around start of her monthly cycle; states it resolves when period is over; she will message me if it becomes bothersome & we will plan for US       Current Outpatient Medications Ordered in Epic   Medication Sig Dispense Refill   • hydrOXYzine HCl (ATARAX) 25 MG Tab Take 1 Tablet by mouth 2 times a day as needed for Anxiety. 60 Tablet 0   • ibuprofen (MOTRIN) 200 MG Tab Take 200 mg by mouth every 6 hours as needed for Mild Pain.     • Multiple Vitamins-Minerals (MULTIVITAMIN PO) Take 1 Tab by mouth every day.       No current [facility]-ordered facility-administered medications on file.        Past Surgical History:   Procedure Laterality Date   • HEMORRHOIDECTOMY  9/11/2019    Procedure: HEMORRHOIDECTOMY - SINGLE QUADRANT;  Surgeon: Garcia Guzman M.D.;  Location: SURGERY [facility];  Service: General   • DILATION AND CURETTAGE     • PRIMARY C SECTION          Allergies:  Caffeine and Keflex    Health Maintenance: Completed    ROS:   Review of Systems   Constitutional: Negative.  Negative for fever and malaise/fatigue.   HENT: Negative.    Eyes: Negative.    Respiratory: Negative for cough, sputum production and shortness of breath.    Cardiovascular: Negative for chest pain, palpitations and leg swelling.   Gastrointestinal: Negative.    Genitourinary: Negative.    Musculoskeletal:  "Negative.    Neurological: Negative.    Endo/Heme/Allergies: Negative.    Psychiatric/Behavioral: Negative.          Objective:     Exam: /54   Pulse 84   Temp 37.1 °C (98.8 °F)   Ht 1.721 m (5' 7.75\")   Wt 66.7 kg (147 lb)   SpO2 97%  Body mass index is 22.52 kg/m².    Physical Exam  Constitutional:       Appearance: Normal appearance.   Cardiovascular:      Rate and Rhythm: Normal rate and regular rhythm.      Pulses: Normal pulses.      Heart sounds: Normal heart sounds.   Pulmonary:      Effort: Pulmonary effort is normal.      Breath sounds: Normal breath sounds.   Musculoskeletal:         General: Normal range of motion.      Cervical back: Normal range of motion and neck supple.   Skin:     General: Skin is warm and dry.   Neurological:      General: No focal deficit present.      Mental Status: She is alert and oriented to person, place, and time.   Psychiatric:         Mood and Affect: Mood normal.         Behavior: Behavior normal.         Thought Content: Thought content normal.         Judgment: Judgment normal.       Labs: none since 2020    Assessment & Plan:   46 y.o. female with the following -    Problem List Items Addressed This Visit     Encounter for sterilization     States she was never contacted by GYN for appointment on permanent birth control; new referral placed today for tubal ligation         Anxiety     No self harm/SI reported today; PHG9-0; NATHALIE-8  She would like to try hydroxyzine in lieu of benadryl for prn anxiety/insomia; will start at hydroxyzine 25mg prn, may increase to 50mg if needed for better management of symptoms         Relevant Medications    hydrOXYzine HCl (ATARAX) 25 MG Tab      Other Visit Diagnoses     Encounter to establish care        Relevant Orders    Comp Metabolic Panel    Lipid Profile    TSH WITH REFLEX TO FT4    Breast cancer screening by mammogram        Relevant Orders    MA-SCREENING MAMMO BILAT W/TOMOSYNTHESIS W/CAD    Tubal ligation evaluation "        Relevant Orders    Referral to OB/Gyn Surgery        Educated in proper administration of medication(s) ordered today including safety, possible SE, risks, benefits, rationale and alternatives to therapy.     Return in about 1 year (around 2/16/2023) for Annual Visit.    Please note that this dictation was created using voice recognition software. I have made every reasonable attempt to correct obvious errors, but I expect that there are errors of grammar and possibly content that I did not discover before finalizing the note.

## 2022-02-17 NOTE — ASSESSMENT & PLAN NOTE
No self harm/SI reported today; PHG9-0; NATHALIE-8  She would like to try hydroxyzine in lieu of benadryl for prn anxiety/insomia; will start at hydroxyzine 25mg prn, may increase to 50mg if needed for better management of symptoms

## 2022-02-17 NOTE — ASSESSMENT & PLAN NOTE
States she was never contacted by GYN for appointment on permanent birth control; new referral placed today for tubal ligation

## 2022-03-12 DIAGNOSIS — F41.9 ANXIETY: ICD-10-CM

## 2022-03-15 RX ORDER — HYDROXYZINE HYDROCHLORIDE 25 MG/1
TABLET, FILM COATED ORAL
Qty: 60 TABLET | Refills: 1 | Status: SHIPPED | OUTPATIENT
Start: 2022-03-15 | End: 2022-04-21

## 2022-04-07 ENCOUNTER — HOSPITAL ENCOUNTER (OUTPATIENT)
Dept: LAB | Facility: MEDICAL CENTER | Age: 47
End: 2022-04-07
Attending: NURSE PRACTITIONER
Payer: COMMERCIAL

## 2022-04-07 DIAGNOSIS — Z76.89 ENCOUNTER TO ESTABLISH CARE: ICD-10-CM

## 2022-04-07 LAB
ALBUMIN SERPL BCP-MCNC: 4.5 G/DL (ref 3.2–4.9)
ALBUMIN/GLOB SERPL: 1.8 G/DL
ALP SERPL-CCNC: 55 U/L (ref 30–99)
ALT SERPL-CCNC: 29 U/L (ref 2–50)
ANION GAP SERPL CALC-SCNC: 10 MMOL/L (ref 7–16)
AST SERPL-CCNC: 26 U/L (ref 12–45)
BILIRUB SERPL-MCNC: 0.9 MG/DL (ref 0.1–1.5)
BUN SERPL-MCNC: 13 MG/DL (ref 8–22)
CALCIUM SERPL-MCNC: 9.3 MG/DL (ref 8.5–10.5)
CHLORIDE SERPL-SCNC: 104 MMOL/L (ref 96–112)
CHOLEST SERPL-MCNC: 191 MG/DL (ref 100–199)
CO2 SERPL-SCNC: 24 MMOL/L (ref 20–33)
CREAT SERPL-MCNC: 0.73 MG/DL (ref 0.5–1.4)
FASTING STATUS PATIENT QL REPORTED: NORMAL
GFR SERPLBLD CREATININE-BSD FMLA CKD-EPI: 103 ML/MIN/1.73 M 2
GLOBULIN SER CALC-MCNC: 2.5 G/DL (ref 1.9–3.5)
GLUCOSE SERPL-MCNC: 86 MG/DL (ref 65–99)
HDLC SERPL-MCNC: 97 MG/DL
LDLC SERPL CALC-MCNC: 85 MG/DL
POTASSIUM SERPL-SCNC: 4.1 MMOL/L (ref 3.6–5.5)
PROT SERPL-MCNC: 7 G/DL (ref 6–8.2)
SODIUM SERPL-SCNC: 138 MMOL/L (ref 135–145)
TRIGL SERPL-MCNC: 45 MG/DL (ref 0–149)
TSH SERPL DL<=0.005 MIU/L-ACNC: 3.13 UIU/ML (ref 0.38–5.33)

## 2022-04-07 PROCEDURE — 80061 LIPID PANEL: CPT

## 2022-04-07 PROCEDURE — 36415 COLL VENOUS BLD VENIPUNCTURE: CPT

## 2022-04-07 PROCEDURE — 84443 ASSAY THYROID STIM HORMONE: CPT

## 2022-04-07 PROCEDURE — 80053 COMPREHEN METABOLIC PANEL: CPT

## 2022-04-13 ENCOUNTER — HOSPITAL ENCOUNTER (OUTPATIENT)
Dept: LAB | Facility: MEDICAL CENTER | Age: 47
End: 2022-04-13
Attending: NURSE PRACTITIONER
Payer: COMMERCIAL

## 2022-04-13 ENCOUNTER — OFFICE VISIT (OUTPATIENT)
Dept: MEDICAL GROUP | Facility: PHYSICIAN GROUP | Age: 47
End: 2022-04-13
Payer: COMMERCIAL

## 2022-04-13 ENCOUNTER — HOSPITAL ENCOUNTER (OUTPATIENT)
Dept: RADIOLOGY | Facility: MEDICAL CENTER | Age: 47
End: 2022-04-13
Attending: NURSE PRACTITIONER
Payer: COMMERCIAL

## 2022-04-13 VITALS
SYSTOLIC BLOOD PRESSURE: 98 MMHG | WEIGHT: 148 LBS | TEMPERATURE: 99.5 F | HEART RATE: 91 BPM | BODY MASS INDEX: 22.43 KG/M2 | DIASTOLIC BLOOD PRESSURE: 58 MMHG | OXYGEN SATURATION: 98 % | HEIGHT: 68 IN

## 2022-04-13 DIAGNOSIS — M25.521 RIGHT ELBOW PAIN: ICD-10-CM

## 2022-04-13 LAB
ERYTHROCYTE [DISTWIDTH] IN BLOOD BY AUTOMATED COUNT: 42.6 FL (ref 35.9–50)
HCT VFR BLD AUTO: 43.3 % (ref 37–47)
HGB BLD-MCNC: 14.4 G/DL (ref 12–16)
MCH RBC QN AUTO: 29.6 PG (ref 27–33)
MCHC RBC AUTO-ENTMCNC: 33.3 G/DL (ref 33.6–35)
MCV RBC AUTO: 88.9 FL (ref 81.4–97.8)
PLATELET # BLD AUTO: 247 K/UL (ref 164–446)
PMV BLD AUTO: 10.6 FL (ref 9–12.9)
RBC # BLD AUTO: 4.87 M/UL (ref 4.2–5.4)
URATE SERPL-MCNC: 4 MG/DL (ref 1.9–8.2)
WBC # BLD AUTO: 4.8 K/UL (ref 4.8–10.8)

## 2022-04-13 PROCEDURE — 73070 X-RAY EXAM OF ELBOW: CPT | Mod: RT

## 2022-04-13 PROCEDURE — 99213 OFFICE O/P EST LOW 20 MIN: CPT | Performed by: NURSE PRACTITIONER

## 2022-04-13 PROCEDURE — 85027 COMPLETE CBC AUTOMATED: CPT

## 2022-04-13 PROCEDURE — 36415 COLL VENOUS BLD VENIPUNCTURE: CPT

## 2022-04-13 PROCEDURE — 84550 ASSAY OF BLOOD/URIC ACID: CPT

## 2022-04-13 ASSESSMENT — FIBROSIS 4 INDEX: FIB4 SCORE: 1.02

## 2022-04-13 NOTE — PROGRESS NOTES
"Chief Complaint   Patient presents with   • Elbow Pain     Right elbow x 2 months        HISTORY OF PRESENT ILLNESS: JENNY CASIANO is a 46 y.o. female established patient who presents today to discuss:  - right elbow pain for the past 2 months; started after she threw ball while playing with her dog; took ibuprofen 400mg one time & had relief for a day; she is active with exercise, gardening.     Current Outpatient Medications on File Prior to Visit   Medication Sig Dispense Refill   • hydrOXYzine HCl (ATARAX) 25 MG Tab TAKE 1 TABLET BY MOUTH TWICE A DAY AS NEEDED FOR ANXIETY 60 Tablet 1   • ibuprofen (MOTRIN) 200 MG Tab Take 200 mg by mouth every 6 hours as needed for Mild Pain.     • Multiple Vitamins-Minerals (MULTIVITAMIN PO) Take 1 Tab by mouth every day.       No current facility-administered medications on file prior to visit.       has a past medical history of Anesthesia, Anxiety, and Depression.     Allergies:Caffeine and Keflex    Health Maintenance: deferred  Review of Systems -included above  Exam:   BP (!) 98/58 (BP Location: Left arm, Patient Position: Sitting, BP Cuff Size: Adult)   Pulse 91   Temp 37.5 °C (99.5 °F) (Temporal)   Ht 1.73 m (5' 8.11\")   Wt 67.1 kg (148 lb)   SpO2 98%   Body mass index is 22.43 kg/m².   General:  Well nourished, well developed female in NAD, appropriate and cooperative with exam.  Musculoskeletal: tender posterior elbow with movements; no redness or swelling noted on exam  Neuro: DTR 2+ in bilateral LE, sensation intact x 4    Assessment/Plan:  1. Right elbow pain  Tender with elbow movement; r/o overuse tendonitis, gout. Check xray, labs (cbc, uric acid); in interim continue conservative measures with prn NSAIDs (states she has constipation so may add stool softener), topical analgesic, ice/heat application; will also send to PT to evaluate/treat as needed    - Referral to Physical Therapy  - CBC WITHOUT DIFFERENTIAL; Future  - URIC ACID; Future  - " DX-ELBOW-LIMITED 2- RIGHT; Future    Follow up:  Return if symptoms worsen or fail to improve.    Educated in proper administration of medication(s) ordered today including safety, possible SE, risks, benefits, rationale and alternatives to therapy.       Please note that this dictation was created using voice recognition software. I have made every reasonable attempt to correct obvious errors, but I expect that there are errors of grammar and possibly content that I did not discover before finalizing the note.

## 2022-04-21 DIAGNOSIS — F41.9 ANXIETY: ICD-10-CM

## 2022-04-21 RX ORDER — HYDROXYZINE HYDROCHLORIDE 25 MG/1
TABLET, FILM COATED ORAL
Qty: 60 TABLET | Refills: 3 | Status: SHIPPED | OUTPATIENT
Start: 2022-04-21 | End: 2022-08-02

## 2022-05-17 ENCOUNTER — PHYSICAL THERAPY (OUTPATIENT)
Dept: PHYSICAL THERAPY | Facility: REHABILITATION | Age: 47
End: 2022-05-17
Attending: NURSE PRACTITIONER
Payer: COMMERCIAL

## 2022-05-17 DIAGNOSIS — M25.521 RIGHT ELBOW PAIN: ICD-10-CM

## 2022-05-17 PROCEDURE — 97110 THERAPEUTIC EXERCISES: CPT

## 2022-05-17 PROCEDURE — 97161 PT EVAL LOW COMPLEX 20 MIN: CPT

## 2022-05-17 ASSESSMENT — ENCOUNTER SYMPTOMS
PAIN SCALE AT HIGHEST: 5
PAIN SCALE: 1
PAIN SCALE AT LOWEST: 0

## 2022-05-17 NOTE — OP THERAPY EVALUATION
Outpatient Physical Therapy  INITIAL EVALUATION    [facility] [place]  [address], [address]  [place] [address]  Phone:  [phone]  Fax:  [phone]    Date of Evaluation: 2022    Patient: Maribel Romo  YOB: 1975  MRN: [id number]     Referring Provider: Clarissa Graham D.N.P.  [address] [place] [place] [address]   Referring Diagnosis Right elbow pain [M25.521]     Time Calculation  Start time: 730  Stop time: 815 Time Calculation (min): 45 minutes         Chief Complaint: R elbow  Visit Diagnoses     ICD-10-CM   1. Right elbow pain  M25.521       Date of onset of impairment: 2022    Subjective:   History of Present Illness:     Mechanism of injury:  Maribel Romo is a 46 y.o. female that presents to therapy with R elbow pain. She states that symptoms came on with sudden onset. Her pain is located along her lateral elbow. Patient denies fevers/chills, numbness/weakness of upper extremities, dizziness, dysphagia, nausea, diplopia, ataxia, and dysarthria.       Aggravating factors: gripping squeezing, compression along the lateral elbow, throwing.   Relieving factors:IBU, topical     ADL limitations:limited use of dominant UE     Pain:     Current pain ratin    At best pain ratin    At worst pain ratin      Past Medical History:   Diagnosis Date   • Anesthesia     hypotension with epidural   • Anxiety    • Depression     anxiety     Past Surgical History:   Procedure Laterality Date   • HEMORRHOIDECTOMY  2019    Procedure: HEMORRHOIDECTOMY - SINGLE QUADRANT;  Surgeon: Garcia Guzman M.D.;  Location: SURGERY [facility];  Service: General   • DILATION AND CURETTAGE     • PRIMARY C SECTION       Social History     Tobacco Use   • Smoking status: Former Smoker     Years: 4.00     Quit date: 2004     Years since quittin.7   • Smokeless tobacco: Never Used   Substance Use Topics   • Alcohol use: Yes      Alcohol/week: 1.8 oz     Types: 3 Glasses of wine per week     Family and Occupational History     Socioeconomic History   • Marital status:      Spouse name: Not on file   • Number of children: Not on file   • Years of education: Not on file   • Highest education level: Not on file   Occupational History   • Not on file       Objective     Active Range of Motion     Right Elbow   Flexion: 140 degrees     Left Wrist   Wrist flexion: 70 degrees   Wrist extension: 80 degrees   Radial deviation: WFL  Ulnar deviation: WFL      Right Wrist   Wrist flexion: 50 (elbow straight) degrees with pain  Wrist extension: 60 (elbow straight) degrees with pain  Radial deviation: WFL  Ulnar deviation: WFL    Strength:      Left Wrist/Hand    (2nd hand position)     Trial 1: 55    Right Wrist/Hand   Right wrist extension strength: painful.  Wrist flexion: WFL  Radial deviation: WFL  Ulnar deviation: WFL   (2nd hand position)     Trial 1: 65    Comments: Pain onset at 60#    Tests     Right Elbow   Positive Cozen's.         Therapeutic Exercises (CPT 09048):       Therapeutic Exercise Summary: HEP instruction/performance and development. Handout provided and exercises located below:  Access Code: [email or web address]  URL: [email or web address]  Date: 05/17/2022  Prepared by: Gabe Pedersen    Exercises        Seated Wrist Flexion Stretch - 3-4 x daily - 20-30 hold      Eccentric Wrist Extension with Resistance - 2 x daily - 2 sets - 12 reps      Patient Education        Tennis Elbow      Time-based treatments/modalities:    Physical Therapy Timed Treatment Charges  Therapeutic exercise minutes (CPT 64515): 10 minutes      Assessment, Response and Plan:   Assessment details:  Maribel Romo is a 46 y.o. female with signs and symptoms consistent with lateral epicondylitis. She requires skilled physical therapy intervention to decrease pain, increase range of motion, increase functional mobility, improve ADL  completion and establish a home exercise program.  Goals:   Short Term Goals:   1. Patient will be Independent with prescribed Home Exercise Program (HEP) and will be able to demonstrate exercises without cues for improved overall symptoms/activity tolerance.   2. Pt will improve PROM without pain to be within 5 degrees of L UE in terms of L wrist mobility with the elbow straight.  Short term goal time span:  2-4 weeks      Long Term Goals:    3. Pt will improve ability to grab everyday objects <10lbs in any position without pain >1/10  4. Pt will improve DASH score to less than 26% indicative of improved function and reduced perceived disability.  Long term goal time span:  4-6 weeks    Plan:   Planned therapy interventions:  Therapeutic Exercise (CPT 75642), Manual Therapy (CPT 52646), Neuromuscular Re-education (CPT 18554) and E Stim Unattended (CPT 66782)  Frequency:  1x week  Duration in weeks:  6  Discussed with:  Patient      Functional Assessment Used  PT Functional Assessment Tool Used: Quick DASH  PT Functional Assessment Score: 36%     Referring provider co-signature:  I have reviewed this plan of care and my co-signature certifies the need for services.    Certification Period: 05/17/2022 to  06/28/22    Physician Signature: ________________________________ Date: ______________

## 2022-05-24 ENCOUNTER — APPOINTMENT (OUTPATIENT)
Dept: PHYSICAL THERAPY | Facility: REHABILITATION | Age: 47
End: 2022-05-24
Attending: NURSE PRACTITIONER
Payer: COMMERCIAL

## 2022-05-31 ENCOUNTER — APPOINTMENT (OUTPATIENT)
Dept: PHYSICAL THERAPY | Facility: REHABILITATION | Age: 47
End: 2022-05-31
Attending: NURSE PRACTITIONER
Payer: COMMERCIAL

## 2022-07-30 DIAGNOSIS — F41.9 ANXIETY: ICD-10-CM

## 2022-08-02 RX ORDER — HYDROXYZINE HYDROCHLORIDE 25 MG/1
TABLET, FILM COATED ORAL
Qty: 180 TABLET | Refills: 0 | Status: SHIPPED | OUTPATIENT
Start: 2022-08-02

## 2022-08-19 ENCOUNTER — HOSPITAL ENCOUNTER (OUTPATIENT)
Dept: RADIOLOGY | Facility: MEDICAL CENTER | Age: 47
End: 2022-08-19
Attending: NURSE PRACTITIONER
Payer: COMMERCIAL

## 2022-08-19 ENCOUNTER — OFFICE VISIT (OUTPATIENT)
Dept: MEDICAL GROUP | Facility: PHYSICIAN GROUP | Age: 47
End: 2022-08-19
Payer: COMMERCIAL

## 2022-08-19 VITALS
TEMPERATURE: 98.1 F | HEART RATE: 83 BPM | WEIGHT: 136 LBS | HEIGHT: 68 IN | BODY MASS INDEX: 20.61 KG/M2 | DIASTOLIC BLOOD PRESSURE: 64 MMHG | SYSTOLIC BLOOD PRESSURE: 94 MMHG | OXYGEN SATURATION: 99 %

## 2022-08-19 DIAGNOSIS — M25.552 LEFT HIP PAIN: ICD-10-CM

## 2022-08-19 DIAGNOSIS — R42 DIZZINESS: ICD-10-CM

## 2022-08-19 PROCEDURE — 99213 OFFICE O/P EST LOW 20 MIN: CPT | Performed by: NURSE PRACTITIONER

## 2022-08-19 PROCEDURE — 73502 X-RAY EXAM HIP UNI 2-3 VIEWS: CPT | Mod: LT

## 2022-08-19 ASSESSMENT — ENCOUNTER SYMPTOMS
COUGH: 0
EYES NEGATIVE: 1
DIZZINESS: 1
PALPITATIONS: 0
GASTROINTESTINAL NEGATIVE: 1
SPUTUM PRODUCTION: 0
FEVER: 0
SHORTNESS OF BREATH: 0
CONSTITUTIONAL NEGATIVE: 1
PSYCHIATRIC NEGATIVE: 1

## 2022-08-19 ASSESSMENT — FIBROSIS 4 INDEX: FIB4 SCORE: 0.88

## 2022-08-19 NOTE — ASSESSMENT & PLAN NOTE
Mild ache in left hip x2 months; exam is unremarkable today; she has full ROM in hip joints without discomfort;  states ache is worse with standing; no tenderness in back or buttock area; she does feel some radiation of the pain down her left thigh sometimes; not much improvement with conservative measures (has tried stretching, ice/heat, topical analgesic, aleve)  - we will check xray and send to physiatry for further eval

## 2022-08-19 NOTE — PROGRESS NOTES
Subjective:     CC:   Chief Complaint   Patient presents with    Hip Pain     Left hip pain x 2 months        HPI:   Patient is a 46 y.o. female here today for evaluation and management of:    Problem   Left Hip Pain    Mild dull ache in left hip the past 2 months, radiating down left thigh; Worse with prolonged standing. Very mild relief with aleve & topical analgesics. Has tried heat/ice.  she has been more active since April, low impact exercises with stretches, light weights, stationary bike.     Dizziness    Reports recently having dizziness when she lays down; has history of palpitations, sometimes feels this as well; decided not to go through with cardiac monitor with past work up       Patient Active Problem List   Diagnosis    Heart palpitations    History of HPV infection    Photosensitivity dermatitis    Well woman exam with routine gynecological exam    Encounter for sterilization    Lump of skin of back    Left foot pain    Anxiety    Left hip pain    Dizziness     Past Medical History:   Diagnosis Date    Anesthesia     hypotension with epidural    Anxiety     Depression     anxiety        Past Surgical History:   Procedure Laterality Date    HEMORRHOIDECTOMY  9/11/2019    Procedure: HEMORRHOIDECTOMY - SINGLE QUADRANT;  Surgeon: Garcia Guzman M.D.;  Location: SURGERY [facility];  Service: General    DILATION AND CURETTAGE      PRIMARY C SECTION          Current Outpatient Medications on File Prior to Visit   Medication Sig Dispense Refill    hydrOXYzine HCl (ATARAX) 25 MG Tab TAKE 1 TABLET BY MOUTH TWICE A DAY AS NEEDED FOR ANXIETY 180 Tablet 0    ibuprofen (MOTRIN) 200 MG Tab Take 200 mg by mouth every 6 hours as needed for Mild Pain.      Multiple Vitamins-Minerals (MULTIVITAMIN PO) Take 1 Tab by mouth every day.       No current facility-administered medications on file prior to visit.      Health Maintenance: Completed    ROS:  Review of Systems   Constitutional: Negative.  Negative for fever  "and malaise/fatigue.   HENT: Negative.     Eyes: Negative.    Respiratory:  Negative for cough, sputum production and shortness of breath.    Cardiovascular:  Negative for chest pain, palpitations and leg swelling.   Gastrointestinal: Negative.    Genitourinary: Negative.    Musculoskeletal:  Positive for joint pain.   Neurological:  Positive for dizziness.   Endo/Heme/Allergies: Negative.    Psychiatric/Behavioral: Negative.       Objective:     Exam:  BP (!) 94/64   Pulse 83   Temp 36.7 °C (98.1 °F) (Temporal)   Ht 1.721 m (5' 7.75\")   Wt 61.7 kg (136 lb)   LMP 08/07/2022 (Exact Date)   SpO2 99%   Breastfeeding No   BMI 20.83 kg/m²  Body mass index is 20.83 kg/m².    Physical Exam  Constitutional:       Appearance: Normal appearance.   Cardiovascular:      Rate and Rhythm: Normal rate and regular rhythm.      Pulses: Normal pulses.      Heart sounds: Normal heart sounds.   Pulmonary:      Effort: Pulmonary effort is normal.      Breath sounds: Normal breath sounds.   Musculoskeletal:         General: Tenderness present. Normal range of motion.      Cervical back: Normal range of motion and neck supple.      Comments: Full ROM in left hip, no deformity or swelling noted; has slight ache with palpation lateral area of left hip   Skin:     General: Skin is warm and dry.   Neurological:      General: No focal deficit present.      Mental Status: She is alert and oriented to person, place, and time.      Cranial Nerves: No cranial nerve deficit.      Coordination: Coordination normal.      Gait: Gait normal.   Psychiatric:         Mood and Affect: Mood normal.         Behavior: Behavior normal.         Thought Content: Thought content normal.         Judgment: Judgment normal.     Assessment & Plan:     46 y.o. female with the following -     Problem List Items Addressed This Visit       Left hip pain     Mild ache in left hip x2 months; exam is unremarkable today; she has full ROM in hip joints without " discomfort;  states ache is worse with standing; no tenderness in back or buttock area; she does feel some radiation of the pain down her left thigh sometimes; not much improvement with conservative measures (has tried stretching, ice/heat, topical analgesic, aleve)  - we will check xray and send to physiatry for further eval         Relevant Orders    DX-HIP-COMPLETE - UNILATERAL 2+ LEFT    Referral to Physiatry (PMR)    Dizziness     New onset dizziness with history of palpitations. Worse when she lays down & gets up from laying position. BP on low side today 94/64, HR 83; recalls dizziness starting around the time they visited [facility] some weeks ago.   - normal physical exam today (except for ache in left hip); labs in the spring were normal including TSH, CBC  - symptoms may be coming from hypotension, exacerbated with heat exposure; she stays hydrated in the day with water; she will try adding some pinches of salt to water few times each week; if no improvement in dizziness and palpitations are persisting, we will send her to cardio for further work up          Return in about 4 weeks (around 9/16/2022) for dizziness.    Please note that this dictation was created using voice recognition software. I have made every reasonable attempt to correct obvious errors, but I expect that there are errors of grammar and possibly content that I did not discover before finalizing the note.

## 2022-08-19 NOTE — ASSESSMENT & PLAN NOTE
New onset dizziness with history of palpitations. Worse when she lays down & gets up from laying position. BP on low side today 94/64, HR 83; recalls dizziness starting around the time they visited [facility] some weeks ago.   - normal physical exam today (except for ache in left hip); labs in the spring were normal including TSH, CBC  - symptoms may be coming from hypotension, exacerbated with heat exposure; she stays hydrated in the day with water; she will try adding some pinches of salt to water few times each week; if no improvement in dizziness and palpitations are persisting, we will send her to cardio for further work up

## 2022-09-16 ENCOUNTER — OFFICE VISIT (OUTPATIENT)
Dept: MEDICAL GROUP | Facility: PHYSICIAN GROUP | Age: 47
End: 2022-09-16
Payer: COMMERCIAL

## 2022-09-16 VITALS
TEMPERATURE: 98.7 F | SYSTOLIC BLOOD PRESSURE: 82 MMHG | WEIGHT: 135 LBS | BODY MASS INDEX: 20.46 KG/M2 | HEART RATE: 74 BPM | OXYGEN SATURATION: 98 % | DIASTOLIC BLOOD PRESSURE: 52 MMHG | HEIGHT: 68 IN

## 2022-09-16 DIAGNOSIS — R00.2 PALPITATIONS: ICD-10-CM

## 2022-09-16 PROCEDURE — 99213 OFFICE O/P EST LOW 20 MIN: CPT | Performed by: NURSE PRACTITIONER

## 2022-09-16 ASSESSMENT — FIBROSIS 4 INDEX: FIB4 SCORE: 0.88

## 2022-09-16 NOTE — PROGRESS NOTES
"Subjective:     CC:   Chief Complaint   Patient presents with    Hypotension     BP has been running low         HPI:   Patient is a 46 y.o. female here today for evaluation and management of:    Problem   Heart palpitations    Reports palpitations since 2017, at first thought related to anxiety. She is now noticing low BP readings at home with the palpitations, averaging 80s/40s on most days. She started taking electrolyte replacement the past week & had some improvement with the palpitations, low BP is persisting. Denies CP, SOB, edema. Had some dizziness in the past.          Patient Active Problem List   Diagnosis    Heart palpitations    History of HPV infection    Photosensitivity dermatitis    Well woman exam with routine gynecological exam    Encounter for sterilization    Lump of skin of back    Left foot pain    Anxiety    Left hip pain    Dizziness       Past Medical History:   Diagnosis Date    Anesthesia     hypotension with epidural    Anxiety     Depression     anxiety        Past Surgical History:   Procedure Laterality Date    HEMORRHOIDECTOMY  9/11/2019    Procedure: HEMORRHOIDECTOMY - SINGLE QUADRANT;  Surgeon: Garcia Guzman M.D.;  Location: SURGERY [facility];  Service: General    DILATION AND CURETTAGE      PRIMARY C SECTION          Current Outpatient Medications on File Prior to Visit   Medication Sig Dispense Refill    hydrOXYzine HCl (ATARAX) 25 MG Tab TAKE 1 TABLET BY MOUTH TWICE A DAY AS NEEDED FOR ANXIETY 180 Tablet 0    ibuprofen (MOTRIN) 200 MG Tab Take 200 mg by mouth every 6 hours as needed for Mild Pain.      Multiple Vitamins-Minerals (MULTIVITAMIN PO) Take 1 Tab by mouth every day.       No current facility-administered medications on file prior to visit.        Objective:     Exam:  BP (!) 82/52   Pulse 74   Temp 37.1 °C (98.7 °F) (Temporal)   Ht 1.721 m (5' 7.75\")   Wt 61.2 kg (135 lb)   SpO2 98%   BMI 20.68 kg/m²  Body mass index is 20.68 kg/m².    Physical " Exam  Constitutional:       Appearance: Normal appearance.   Cardiovascular:      Rate and Rhythm: Normal rate and regular rhythm.      Pulses: Normal pulses.      Heart sounds: Normal heart sounds. No murmur heard.  Pulmonary:      Effort: Pulmonary effort is normal.      Breath sounds: Normal breath sounds.   Musculoskeletal:      Right lower leg: No edema.      Left lower leg: No edema.   Skin:     General: Skin is warm and dry.   Neurological:      General: No focal deficit present.      Mental Status: She is alert and oriented to person, place, and time.       Assessment & Plan:     46 y.o. female with the following -     Problem List Items Addressed This Visit       Heart palpitations     - unclear etiology for sustained hypotension and random palpitations; physical exam is normal. BP in clinic today is 82/52, HR 74.   - we will order echo and refer to vascular for further work up         Relevant Orders    Referral to Vascular Medicine    EC-ECHOCARDIOGRAM COMPLETE W/O CONT   - referral to physiatry for left hip pain still pending; I have message K[place]a to check on this.    Return in about 3 months (around 12/16/2022) for hip, palpitations f-up.    Please note that this dictation was created using voice recognition software. I have made every reasonable attempt to correct obvious errors, but I expect that there are errors of grammar and possibly content that I did not discover before finalizing the note.

## 2022-09-16 NOTE — ASSESSMENT & PLAN NOTE
- unclear etiology for sustained hypotension and random palpitations; physical exam is normal. BP in clinic today is 82/52, HR 74.   - we will order echo and refer to vascular for further work up

## 2022-09-20 ENCOUNTER — TELEPHONE (OUTPATIENT)
Dept: VASCULAR LAB | Facility: MEDICAL CENTER | Age: 47
End: 2022-09-20
Payer: COMMERCIAL

## 2022-09-20 NOTE — TELEPHONE ENCOUNTER
Called and left VM for pt to call back to get scheduled for initial vascular med appt with Dr. Bloch, next available. Try 10/5 and 10/6 first.     ----- Message from Michael J Bloch, M.D. sent at 9/19/2022  6:05 PM PDT -----  Regarding: Referral  Please schedule initial visit with block when available

## 2022-09-26 ENCOUNTER — OFFICE VISIT (OUTPATIENT)
Dept: PHYSICAL MEDICINE AND REHAB | Facility: MEDICAL CENTER | Age: 47
End: 2022-09-26
Payer: COMMERCIAL

## 2022-09-26 VITALS
BODY MASS INDEX: 20.98 KG/M2 | HEIGHT: 68 IN | SYSTOLIC BLOOD PRESSURE: 102 MMHG | TEMPERATURE: 98.9 F | WEIGHT: 138.45 LBS | HEART RATE: 79 BPM | DIASTOLIC BLOOD PRESSURE: 72 MMHG | OXYGEN SATURATION: 99 %

## 2022-09-26 DIAGNOSIS — M25.552 HIP PAIN, LEFT: ICD-10-CM

## 2022-09-26 DIAGNOSIS — M25.562 LEFT KNEE PAIN, UNSPECIFIED CHRONICITY: ICD-10-CM

## 2022-09-26 DIAGNOSIS — M54.50 CHRONIC BILATERAL LOW BACK PAIN, UNSPECIFIED WHETHER SCIATICA PRESENT: ICD-10-CM

## 2022-09-26 DIAGNOSIS — M25.852 LEFT HIP IMPINGEMENT SYNDROME: ICD-10-CM

## 2022-09-26 DIAGNOSIS — M79.2 NEURALGIA: ICD-10-CM

## 2022-09-26 DIAGNOSIS — Q65.89 HIP DYSPLASIA: ICD-10-CM

## 2022-09-26 DIAGNOSIS — G89.29 CHRONIC BILATERAL LOW BACK PAIN, UNSPECIFIED WHETHER SCIATICA PRESENT: ICD-10-CM

## 2022-09-26 PROCEDURE — 99204 OFFICE O/P NEW MOD 45 MIN: CPT | Performed by: PHYSICAL MEDICINE & REHABILITATION

## 2022-09-26 ASSESSMENT — PATIENT HEALTH QUESTIONNAIRE - PHQ9
SUM OF ALL RESPONSES TO PHQ QUESTIONS 1-9: 11
5. POOR APPETITE OR OVEREATING: 2 - MORE THAN HALF THE DAYS
CLINICAL INTERPRETATION OF PHQ2 SCORE: 4

## 2022-09-26 ASSESSMENT — FIBROSIS 4 INDEX: FIB4 SCORE: 0.88

## 2022-09-26 ASSESSMENT — PAIN SCALES - GENERAL: PAINLEVEL: 5=MODERATE PAIN

## 2022-09-26 NOTE — PROGRESS NOTES
New patient note    Interventional spine and Pain  Physiatry (physical medicine and  Rehabilitation)     Date of service: See epic    Chief complaint:   Chief Complaint   Patient presents with    New Patient     Hip pain        Referring provider: Clarissa Graham D.N.P.     HISTORY    HPI: Maribel Romo 46 y.o.  who presents today with Diagnoses of Left hip impingement syndrome, Hip dysplasia, Hip pain, left, Chronic low back pain, Left knee pain, and Neuralgia were pertinent to this visit.    HPI    The patient is a chronic aching low back pain.  However over the past 3 months she has had pain mostly in the left hip which radiates down the anterior aspect of the thigh towards the medial aspect of the knee.  The hip and knee pain and the radiating pain are correlated no secondary other.  Moderate intensity.  Aching in quality. She does have itching and burning sensation on the left foot which is intermittent. She has been doing increased exercises. She has a popping sensation in the bilateral hips.        Medical records review:  I reviewed the note from the referring provider Clarissa Graham D.N.P. including the note dated 9/16/2022.           ROS:   Red Flags ROS:   Fever, Chills, Sweats: Denies  Involuntary Weight Loss: Denies  Bladder Incontinence: Denies  Bowel Incontinence: denies  Saddle Anesthesia: Denies    All other systems reviewed and negative.       PMHx:   Past Medical History:   Diagnosis Date    Anesthesia     hypotension with epidural    Anxiety     Depression     anxiety         Current Outpatient Medications on File Prior to Visit   Medication Sig Dispense Refill    hydrOXYzine HCl (ATARAX) 25 MG Tab TAKE 1 TABLET BY MOUTH TWICE A DAY AS NEEDED FOR ANXIETY 180 Tablet 0    ibuprofen (MOTRIN) 200 MG Tab Take 200 mg by mouth every 6 hours as needed for Mild Pain.      Multiple Vitamins-Minerals (MULTIVITAMIN PO) Take 1 Tab by mouth every day.       No current facility-administered  medications on file prior to visit.        PSHx:   Past Surgical History:   Procedure Laterality Date    HEMORRHOIDECTOMY  2019    Procedure: HEMORRHOIDECTOMY - SINGLE QUADRANT;  Surgeon: Garcia Guzman M.D.;  Location: SURGERY [facility];  Service: General    DILATION AND CURETTAGE      PRIMARY C SECTION         Family history   Family History   Problem Relation Age of Onset    Hyperlipidemia Mother     No Known Problems Father     Thyroid Sister 38        hashimoto thyroiditis    Breast Cancer Paternal Aunt          Medications: reviewed on epic.   Outpatient Medications Marked as Taking for the 22 encounter (Office Visit) with Mendez Maier M.D.   Medication Sig Dispense Refill    hydrOXYzine HCl (ATARAX) 25 MG Tab TAKE 1 TABLET BY MOUTH TWICE A DAY AS NEEDED FOR ANXIETY 180 Tablet 0    ibuprofen (MOTRIN) 200 MG Tab Take 200 mg by mouth every 6 hours as needed for Mild Pain.      Multiple Vitamins-Minerals (MULTIVITAMIN PO) Take 1 Tab by mouth every day.          Allergies:   Allergies   Allergen Reactions    Caffeine Palpitations    Keflex Rash and Itching     ALL OVER BODY RASH, SCRATCHY THROAT       Social Hx:   Social History     Socioeconomic History    Marital status:      Spouse name: Not on file    Number of children: Not on file    Years of education: Not on file    Highest education level: Not on file   Occupational History    Not on file   Tobacco Use    Smoking status: Former     Years: 4.00     Types: Cigarettes     Quit date: 2004     Years since quittin.0    Smokeless tobacco: Never   Vaping Use    Vaping Use: Never used   Substance and Sexual Activity    Alcohol use: Yes     Alcohol/week: 1.8 oz     Types: 3 Glasses of wine per week    Drug use: No    Sexual activity: Yes     Partners: Male     Birth control/protection: Condom   Other Topics Concern    Not on file   Social History Narrative    Not on file     Social Determinants of Health     Financial Resource  "Strain: Not on file   Food Insecurity: Not on file   Transportation Needs: Not on file   Physical Activity: Not on file   Stress: Not on file   Social Connections: Not on file   Intimate Partner Violence: Not on file   Housing Stability: Not on file         EXAMINATION     Physical Exam:   Vitals: /72 (BP Location: Right arm, Patient Position: Sitting, BP Cuff Size: Adult)   Pulse 79   Temp 37.2 °C (98.9 °F) (Temporal)   Ht 1.721 m (5' 7.75\")   Wt 62.8 kg (138 lb 7.2 oz)   SpO2 99%     Constitutional:   Body Habitus: Body mass index is 21.21 kg/m².  Cooperation: Fully cooperates with exam  Appearance: Well-groomed, well-nourished, not disheveled     Eyes: No scleral icterus to suggest severe liver disease, no proptosis to suggest severe hyperthyroid    ENT -no obvious auditory deficits, no obvious tongue lesions, tongue midline, no facial droop     Skin -no rashes or lesions noted     Respiratory-  breathing comfortable on room air, no audible wheezing    Cardiovascular- capillary refills less than 2 seconds.     Psychiatric- alert and oriented ×3. Normal affect.     Gait - normal gait, no use of ambulatory device, nonantalgic.     Musculoskeletal and Neuro -       Thoracic/Lumbar Spine/Sacral Spine/Hips   Inspection: No evidence of atrophy in bilateral lower extremities throughout     ROM: full  active range of motion with flexion, lateral flexion, and rotation bilaterally.   There is full  active range of motion with lumbar extension .    Palpation:   No tenderness to palpation in midline at T1-T12 levels. No tenderness to palpation in the left and right of the midline T1-L5, NEGATIVE for tenderness to palpation to the para-midline region in the lower lumbar levels.  palpation over SI joint: negative bilaterally    palpation in hip or over the gluteus medius tendon insertion: negative bilaterally      Lumbar spine Special tests  Neuro tension  Straight leg test negative bilaterally    Slump test " negative bilaterally      HIP  FAIR test positive bilaterally    Range of motion in the RIGHT hip is decreased in flexion, extension, abduction, internal rotation, external rotation.  Range of motion in the LEFT hip is decreased in flexion, extension, abduction, internal rotation, external rotation.      SI joint tests  SI joint compression negative bilaterally    SI joint distraction negative bilaterally    Thigh thrust test negative bilaterally    MAX test negative bilaterally                 Key points for the international standards for neurological classification of spinal cord injury (ISNCSCI) to light touch.     Dermatome R L                                      L2 2 2   L3 2 2   L4 2 2   L5 2 2   S1 2 2   S2 2 2       Motor Exam Lower Extremities    ? Myotome R L   Hip flexion L2 5 5   Knee extension L3 5 5   Ankle dorsiflexion L4 5 5   Toe extension L5 5 5   Ankle plantarflexion S1 5 5         Reflexes  ?  R L                Patella  2+ 2+   Achilles   2+ 2+       Babinski sign negative bilaterally   Clonus of the ankle negative bilaterally       MEDICAL DECISION MAKING    Medical records review: see under HPI section.     DATA    Labs:   Lab Results   Component Value Date/Time    SODIUM 138 04/07/2022 06:29 AM    POTASSIUM 4.1 04/07/2022 06:29 AM    CHLORIDE 104 04/07/2022 06:29 AM    CO2 24 04/07/2022 06:29 AM    ANION 10.0 04/07/2022 06:29 AM    GLUCOSE 86 04/07/2022 06:29 AM    BUN 13 04/07/2022 06:29 AM    CREATININE 0.73 04/07/2022 06:29 AM    CALCIUM 9.3 04/07/2022 06:29 AM    ASTSGOT 26 04/07/2022 06:29 AM    ALTSGPT 29 04/07/2022 06:29 AM    TBILIRUBIN 0.9 04/07/2022 06:29 AM    ALBUMIN 4.5 04/07/2022 06:29 AM    TOTPROTEIN 7.0 04/07/2022 06:29 AM    GLOBULIN 2.5 04/07/2022 06:29 AM    AGRATIO 1.8 04/07/2022 06:29 AM   ]    No results found for: PROTHROMBTM, INR     Lab Results   Component Value Date/Time    WBC 4.8 04/13/2022 10:11 AM    RBC 4.87 04/13/2022 10:11 AM    HEMOGLOBIN 14.4 04/13/2022  10:11 AM    HEMATOCRIT 43.3 04/13/2022 10:11 AM    MCV 88.9 04/13/2022 10:11 AM    MCH 29.6 04/13/2022 10:11 AM    MCHC 33.3 (L) 04/13/2022 10:11 AM    MPV 10.6 04/13/2022 10:11 AM    NEUTSPOLYS 60.50 08/22/2020 07:31 AM    LYMPHOCYTES 30.20 08/22/2020 07:31 AM    MONOCYTES 7.30 08/22/2020 07:31 AM    EOSINOPHILS 0.90 08/22/2020 07:31 AM    BASOPHILS 0.90 08/22/2020 07:31 AM        No results found for: HBA1C     Imaging:   I personally reviewed following images, these are my reads  X-ray hips 8/19/2022  Morphology of the acetabulum consistent with mild bilateral dysplasia left worse than right.  Femoral head neck junction consistent with hip impingement syndrome on the left      IMAGING radiology reads. I reviewed the following radiology reads             X-ray hips 8/19/2022  IMPRESSION:        1.  Mild bilateral congenital acetabular dysplasia.  2.  No acute fracture or significant osteoarthrosis noted about either hip.                     Results for orders placed during the hospital encounter of 09/09/21    DX-FOOT-COMPLETE 3+ LEFT    Impression  No radiographic evidence of acute displaced fracture or subluxation.                                      Diagnosis   Visit Diagnoses     ICD-10-CM   1. Left hip impingement syndrome  M25.852   2. Hip dysplasia  Q65.89   3. Hip pain, left  M25.552   4. Chronic low back pain  M54.50    G89.29   5. Left knee pain  M25.562   6. Neuralgia  M79.2           ASSESSMENT AND PLAN:  Maribel Mijares Demetrius 46 y.o. female      Maribel was seen today for new patient.    Diagnoses and all orders for this visit:    Left hip impingement syndrome  -     Referral to Pain Clinic    Hip dysplasia  -     Referral to Pain Clinic    Hip pain, left  -     Referral to Pain Clinic    Chronic low back pain    Left knee pain    Neuralgia      We discussed the differential which could be of hip etiology or nerve etiology.  The radiating pain from the hip down the anterior thigh towards the  medial aspect of the knee is concerning for L3 distribution.  Also on differential is a femoral nerve.  However hip movements exacerbate this and with the imaging findings intra-articular hip pathology is suspected.  We discussed a work-up with an EMG and MRI of the lumbar spine versus diagnostic and therapeutic injections of the left hip.  We decided we would pursue a left hip injection with ultrasound guidance for diagnostic and therapeutic purposes.  I placed an order for this.  The patient wishes to discuss with finance prior to the procedure.  I gave the patient information for this.  She wants to avoid pain medications.  During flares of pain she can use ibuprofen up to 600 mg 3 times daily as needed pain with food.    Follow-up: After the above diagnostic studies           Please note that this dictation was created using voice recognition software. I have made every reasonable attempt to correct obvious errors but there may be errors of grammar and content that I may have overlooked prior to finalization of this note.      Mendez Maier MD  Physical Medicine and Rehabilitation  Interventional Spine and Sports Physiatry  [facility]           Clarissa Agarwal D.N.P.

## 2022-10-06 ENCOUNTER — OFFICE VISIT (OUTPATIENT)
Dept: VASCULAR LAB | Facility: MEDICAL CENTER | Age: 47
End: 2022-10-06
Attending: INTERNAL MEDICINE
Payer: COMMERCIAL

## 2022-10-06 VITALS
DIASTOLIC BLOOD PRESSURE: 71 MMHG | HEIGHT: 67 IN | HEART RATE: 98 BPM | SYSTOLIC BLOOD PRESSURE: 109 MMHG | BODY MASS INDEX: 21.22 KG/M2 | RESPIRATION RATE: 16 BRPM | WEIGHT: 135.2 LBS

## 2022-10-06 DIAGNOSIS — R00.2 PALPITATIONS: ICD-10-CM

## 2022-10-06 PROCEDURE — 99212 OFFICE O/P EST SF 10 MIN: CPT

## 2022-10-06 PROCEDURE — 99202 OFFICE O/P NEW SF 15 MIN: CPT | Performed by: NURSE PRACTITIONER

## 2022-10-06 PROCEDURE — 99213 OFFICE O/P EST LOW 20 MIN: CPT | Performed by: INTERNAL MEDICINE

## 2022-10-06 RX ORDER — MULTIVIT-MIN/FERROUS FUMARATE 9 MG/15 ML
LIQUID (ML) ORAL
COMMUNITY

## 2022-10-06 ASSESSMENT — FIBROSIS 4 INDEX: FIB4 SCORE: 0.88

## 2022-10-06 NOTE — PROGRESS NOTES
VASCULAR MEDICINE CLINIC - INITIAL VISIT  10/06/22     Maribel Romo is a 46 y.o.  female who presents today  for cardiovascular evaluation     Subjective      HPI:  Patient referred for evaluation and management of heart palpitations and low blood pressure  Patient says that she has had a long history of palpitations.  Over the summer she started having a slightly different palpitation that she noticed when she was lying in bed and rolled over onto her side.  In general her palpitations are brought on by anxiety or exercise.  They usually last for less than a minute.  She does have palpitations almost every day.  Palpitations were not associated with chest pain.  She does get lightheaded when she has palpitations.  She has never had a cardiovascular work-up.  She also states that when going from laying down to sitting or from seated to standing she will often get a little bit lightheaded.  This also happens a few times a week.  She has never passed out.  She will also sometimes get a little bit lightheaded if she has having trouble with constipation or feeling very anxious.  She is been measuring her blood pressure at home and in the mornings or in the middle of the night it can be in the 70s over 40s.  During the day it is mostly in the 90s to 100 systolic.  She has been trying to eat more salt.  She also complains of ringing in her ears after vigorous exercise or sex -bilateral.  No headache    Past Medical History:   Diagnosis Date    Anesthesia     hypotension with epidural    Anxiety     Depression     anxiety        Past Surgical History:   Procedure Laterality Date    HEMORRHOIDECTOMY  9/11/2019    Procedure: HEMORRHOIDECTOMY - SINGLE QUADRANT;  Surgeon: Garcia Guzman M.D.;  Location: SURGERY [facility];  Service: General    DILATION AND CURETTAGE      PRIMARY C SECTION          Family History   Problem Relation Age of Onset    Hyperlipidemia Mother     No Known Problems Father     Thyroid  "Sister 38        hashimoto thyroiditis    Breast Cancer Paternal Aunt         Social History     Tobacco Use    Smoking status: Former     Years: 4.00     Types: Cigarettes     Quit date: 2004     Years since quittin.1    Smokeless tobacco: Never   Vaping Use    Vaping Use: Never used   Substance Use Topics    Alcohol use: Yes     Alcohol/week: 1.8 oz     Types: 3 Glasses of wine per week    Drug use: No        Current Outpatient Medications on File Prior to Visit   Medication Sig Dispense Refill    Calcium-Magnesium-Vitamin D (CALCIUM MAGNESIUM PO) Take  by mouth.      Cod Liver Oil 1000 MG Cap Take  by mouth.      Cholecalciferol (VITAMIN D3) 30 MCG/15ML Liquid Take  by mouth.      Multiple Vitamins-Minerals (AIRBORNE PO) Take  by mouth.      Acetylcysteine (N-ACETYL-L-CYSTEINE PO) Take  by mouth.      hydrOXYzine HCl (ATARAX) 25 MG Tab TAKE 1 TABLET BY MOUTH TWICE A DAY AS NEEDED FOR ANXIETY 180 Tablet 0    ibuprofen (MOTRIN) 200 MG Tab Take 200 mg by mouth every 6 hours as needed for Mild Pain.      Multiple Vitamins-Minerals (MULTIVITAMIN PO) Take 1 Tab by mouth every day.       No current facility-administered medications on file prior to visit.        ALLERGIES  Caffeine and Keflex     DIET AND EXERCISE:  Weight Change: Stable  Diet: Very heart healthy  Exercise: moderate regular exercise program           Objective     Objective:     Vitals:    10/06/22 1008 10/06/22 1011   BP: 111/72 109/71   BP Location: Left arm    Patient Position: Sitting    BP Cuff Size: Adult    Pulse: (!) 102 98   Resp: 16    Weight: 61.3 kg (135 lb 3.2 oz)    Height: 1.702 m (5' 7\")         Physical Exam  Vitals reviewed.   Constitutional:       General: She is not in acute distress.     Appearance: She is not diaphoretic.   HENT:      Head: Normocephalic and atraumatic.   Eyes:      General: No scleral icterus.     Conjunctiva/sclera: Conjunctivae normal.   Neck:      Vascular: No carotid bruit.   Cardiovascular:      " Rate and Rhythm: Normal rate and regular rhythm.      Heart sounds: Normal heart sounds. No murmur heard.  Pulmonary:      Effort: Pulmonary effort is normal. No respiratory distress.      Breath sounds: Normal breath sounds. No wheezing or rales.   Musculoskeletal:      Right lower leg: No edema.      Left lower leg: No edema.   Skin:     Coloration: Skin is not pale.   Neurological:      General: No focal deficit present.      Mental Status: She is alert and oriented to person, place, and time.      Cranial Nerves: No cranial nerve deficit.      Coordination: Coordination normal.      Gait: Gait is intact. Gait normal.   Psychiatric:         Mood and Affect: Mood and affect normal.         Behavior: Behavior normal.        DATA REVIEW    Lab Results   Component Value Date/Time    CHOLSTRLTOT 191 04/07/2022 06:29 AM    LDL 85 04/07/2022 06:29 AM    HDL 97 04/07/2022 06:29 AM    TRIGLYCERIDE 45 04/07/2022 06:29 AM       Lab Results   Component Value Date/Time    SODIUM 138 04/07/2022 06:29 AM    POTASSIUM 4.1 04/07/2022 06:29 AM    CHLORIDE 104 04/07/2022 06:29 AM    CO2 24 04/07/2022 06:29 AM    GLUCOSE 86 04/07/2022 06:29 AM    BUN 13 04/07/2022 06:29 AM    CREATININE 0.73 04/07/2022 06:29 AM     Lab Results   Component Value Date/Time    ALKPHOSPHAT 55 04/07/2022 06:29 AM    ASTSGOT 26 04/07/2022 06:29 AM    ALTSGPT 29 04/07/2022 06:29 AM    TBILIRUBIN 0.9 04/07/2022 06:29 AM       No results found for: HBA1C    No results found for: MICROALBCALC, MALBCRT, MALBEXCR, UUBXAJ80, MICROALBUR, MICRALB, UMICROALBUM, MICROALBTIM        Medical Decision Making:  Today's Assessment / Status / Plan:     1. Heart palpitations  RIH ZIO PATCH MONITOR    Comp Metabolic Panel    CBC WITHOUT DIFFERENTIAL    TSH    METANEPHRINES PLASMA           ASSESSMENT:    Palpitations, longstanding but changing in character and more frequent.  Self-limiting.  No syncope.  No associated symptoms.  Often associated with anxiety.  Sound fairly  benign, but will work-up further per below    2.  Intermittent lightheadedness with relative hypotension-not associated with palpitations.  Mostly associated with orthostatic changes or stressful situations.  Perhaps a bit of vasodepressor phenomenon.  Once again symptoms are relatively mild    3.  Possible atypical migraine -she gets ringing in both ears after sex or vigorous exercise.  She has a family history of migraine.  Would not work-up further at this time    PLAN:    -Obtain echocardiogram  -Obtain Zio patch  -Obtain blood work as above  -Increase sodium intake, particularly at night  -Start regular guided breathing  -Agree with a counselor  -Continue excellent diet and exercise habits  -Can consider pharmacologic therapy depending on results of work-up and her response to the above lifestyle issues    Instructed to follow-up with PCP for remainder of adult medical needs: yes  We will partner with other providers in the management of established vascular disease and cardiometabolic risk factors.    Studies to Be Obtained: Echocardiogram and Zio patch  Labs to Be Obtained: As above prior to next visit    Follow up in: 3 months    Michael J Bloch, M.D.     Cc: MIRNA Graham

## 2022-10-07 ENCOUNTER — NON-PROVIDER VISIT (OUTPATIENT)
Dept: CARDIOLOGY | Facility: MEDICAL CENTER | Age: 47
End: 2022-10-07
Attending: INTERNAL MEDICINE
Payer: COMMERCIAL

## 2022-10-07 DIAGNOSIS — R00.2 PALPITATIONS: ICD-10-CM

## 2022-10-07 DIAGNOSIS — I49.1 PREMATURE ATRIAL CONTRACTIONS: ICD-10-CM

## 2022-10-07 DIAGNOSIS — I49.3 PVCS (PREMATURE VENTRICULAR CONTRACTIONS): ICD-10-CM

## 2022-10-07 DIAGNOSIS — I47.10 SVT (SUPRAVENTRICULAR TACHYCARDIA) (HCC): ICD-10-CM

## 2022-10-07 NOTE — PROGRESS NOTES
Home enrollment completed for the 30 day [employer] Heart monitoring program per Michael Bloch, MD.  >Monitor to be shipped to patient by [employer]  >Pending Baseline.  >Pending EOS.

## 2022-10-14 ENCOUNTER — HOSPITAL ENCOUNTER (OUTPATIENT)
Dept: LAB | Facility: MEDICAL CENTER | Age: 47
End: 2022-10-14
Attending: INTERNAL MEDICINE
Payer: COMMERCIAL

## 2022-10-14 DIAGNOSIS — R00.2 PALPITATIONS: ICD-10-CM

## 2022-10-14 LAB
ALBUMIN SERPL BCP-MCNC: 4.5 G/DL (ref 3.2–4.9)
ALBUMIN/GLOB SERPL: 1.8 G/DL
ALP SERPL-CCNC: 52 U/L (ref 30–99)
ALT SERPL-CCNC: 20 U/L (ref 2–50)
ANION GAP SERPL CALC-SCNC: 7 MMOL/L (ref 7–16)
AST SERPL-CCNC: 14 U/L (ref 12–45)
BILIRUB SERPL-MCNC: 1 MG/DL (ref 0.1–1.5)
BUN SERPL-MCNC: 14 MG/DL (ref 8–22)
CALCIUM SERPL-MCNC: 9 MG/DL (ref 8.5–10.5)
CHLORIDE SERPL-SCNC: 105 MMOL/L (ref 96–112)
CO2 SERPL-SCNC: 25 MMOL/L (ref 20–33)
CREAT SERPL-MCNC: 0.81 MG/DL (ref 0.5–1.4)
ERYTHROCYTE [DISTWIDTH] IN BLOOD BY AUTOMATED COUNT: 41.8 FL (ref 35.9–50)
GFR SERPLBLD CREATININE-BSD FMLA CKD-EPI: 91 ML/MIN/1.73 M 2
GLOBULIN SER CALC-MCNC: 2.5 G/DL (ref 1.9–3.5)
GLUCOSE SERPL-MCNC: 86 MG/DL (ref 65–99)
HCT VFR BLD AUTO: 40.8 % (ref 37–47)
HGB BLD-MCNC: 13.4 G/DL (ref 12–16)
MCH RBC QN AUTO: 29.8 PG (ref 27–33)
MCHC RBC AUTO-ENTMCNC: 32.8 G/DL (ref 33.6–35)
MCV RBC AUTO: 90.9 FL (ref 81.4–97.8)
PLATELET # BLD AUTO: 215 K/UL (ref 164–446)
PMV BLD AUTO: 10.8 FL (ref 9–12.9)
POTASSIUM SERPL-SCNC: 4.4 MMOL/L (ref 3.6–5.5)
PROT SERPL-MCNC: 7 G/DL (ref 6–8.2)
RBC # BLD AUTO: 4.49 M/UL (ref 4.2–5.4)
SODIUM SERPL-SCNC: 137 MMOL/L (ref 135–145)
TSH SERPL DL<=0.005 MIU/L-ACNC: 2 UIU/ML (ref 0.38–5.33)
WBC # BLD AUTO: 4.4 K/UL (ref 4.8–10.8)

## 2022-10-14 PROCEDURE — 85027 COMPLETE CBC AUTOMATED: CPT

## 2022-10-14 PROCEDURE — 80053 COMPREHEN METABOLIC PANEL: CPT

## 2022-10-14 PROCEDURE — 36415 COLL VENOUS BLD VENIPUNCTURE: CPT

## 2022-10-14 PROCEDURE — 84443 ASSAY THYROID STIM HORMONE: CPT

## 2022-10-14 PROCEDURE — 83835 ASSAY OF METANEPHRINES: CPT

## 2022-10-19 LAB
METANEPHS SERPL-SCNC: 0.15 NMOL/L (ref 0–0.49)
NORMETANEPHRINE SERPL-SCNC: 0.42 NMOL/L (ref 0–0.89)

## 2022-10-24 ENCOUNTER — OFFICE VISIT (OUTPATIENT)
Dept: PHYSICAL MEDICINE AND REHAB | Facility: MEDICAL CENTER | Age: 47
End: 2022-10-24
Payer: COMMERCIAL

## 2022-10-24 VITALS
SYSTOLIC BLOOD PRESSURE: 120 MMHG | HEART RATE: 77 BPM | TEMPERATURE: 99 F | BODY MASS INDEX: 21.12 KG/M2 | HEIGHT: 68 IN | OXYGEN SATURATION: 99 % | WEIGHT: 139.33 LBS | DIASTOLIC BLOOD PRESSURE: 46 MMHG

## 2022-10-24 DIAGNOSIS — Q65.89 HIP DYSPLASIA: ICD-10-CM

## 2022-10-24 DIAGNOSIS — M25.552 HIP PAIN, LEFT: ICD-10-CM

## 2022-10-24 DIAGNOSIS — M25.852 LEFT HIP IMPINGEMENT SYNDROME: ICD-10-CM

## 2022-10-24 DIAGNOSIS — M54.50 CHRONIC BILATERAL LOW BACK PAIN, UNSPECIFIED WHETHER SCIATICA PRESENT: ICD-10-CM

## 2022-10-24 DIAGNOSIS — G89.29 CHRONIC BILATERAL LOW BACK PAIN, UNSPECIFIED WHETHER SCIATICA PRESENT: ICD-10-CM

## 2022-10-24 PROCEDURE — 20611 DRAIN/INJ JOINT/BURSA W/US: CPT | Mod: LT | Performed by: PHYSICAL MEDICINE & REHABILITATION

## 2022-10-24 ASSESSMENT — PATIENT HEALTH QUESTIONNAIRE - PHQ9
CLINICAL INTERPRETATION OF PHQ2 SCORE: 2
SUM OF ALL RESPONSES TO PHQ QUESTIONS 1-9: 6
5. POOR APPETITE OR OVEREATING: 1 - SEVERAL DAYS

## 2022-10-24 ASSESSMENT — FIBROSIS 4 INDEX: FIB4 SCORE: 0.66

## 2022-10-24 ASSESSMENT — PAIN SCALES - GENERAL: PAINLEVEL: 3=SLIGHT PAIN

## 2022-10-24 NOTE — PROCEDURES
Date of Service: 10/24/2022    Physician/s: Mendez Maier MD    Pre-operative Diagnosis:    Visit Diagnoses     ICD-10-CM   1. Left hip impingement syndrome  M25.852   2. Hip dysplasia  Q65.89   3. Hip pain, left  M25.552         Post-operative Diagnosis:   Visit Diagnoses     ICD-10-CM   1. Left hip impingement syndrome  M25.852   2. Hip dysplasia  Q65.89   3. Hip pain, left  M25.552         Procedure: diagnostic left Hip injection ultrasound-guided    Description of procedure:    The risks, benefits, and alternatives of the procedure were reviewed and discussed with the patient.  Written informed consent was freely obtained. A pre-procedural time-out was conducted by the physician verifying patient’s identity, procedure to be performed, procedure site and side, and allergy verification. Appropriate equipment was determined to be in place for the procedure.     No sedation was used for this procedure.     A female chaperone was present during the entire procedure    In the office suite the patient was placed in a supine position with her  left, and the skin was prepped and draped in the usual sterile fashion. The ultrasound probe was placed over the left  hip joint and the femoral head and neck junction was located and the target for injection was marked. A 25g 3.5 inch needle was placed into skin and advanced under ultrasound guidance into the joint space. Following negative aspiration, approx 5mL of 1% lidocaine was then injected into the joint, and the needle was subsequently removed intact after restyleted. The patient's hip was wiped with a 4x4 gauze, the area was cleansed with alcohol prep, and a bandaid was applied. There were no complications noted.     Preprocedure FAIRs maneuver positive for hip pain and back pain  Post procedure FAIRs maneuver is negative for hip pain and negative for back pain    This is a positive diagnostic block.  I referred the patient to orthopedics for evaluation.  Of note this  was a diagnostic test only and no steroid was used with this injection    Mendez Maier MD  Interventional Spine and Pain  Physical Medicine and Rehabilitation  [facility]

## 2022-11-01 ENCOUNTER — TELEPHONE (OUTPATIENT)
Dept: CARDIOLOGY | Facility: MEDICAL CENTER | Age: 47
End: 2022-11-01
Payer: COMMERCIAL

## 2022-11-02 PROCEDURE — 93268 ECG RECORD/REVIEW: CPT | Performed by: INTERNAL MEDICINE

## 2022-12-07 ENCOUNTER — ANCILLARY PROCEDURE (OUTPATIENT)
Dept: CARDIOLOGY | Facility: MEDICAL CENTER | Age: 47
End: 2022-12-07
Attending: NURSE PRACTITIONER
Payer: COMMERCIAL

## 2022-12-07 DIAGNOSIS — R00.2 PALPITATIONS: ICD-10-CM

## 2022-12-07 PROCEDURE — 93306 TTE W/DOPPLER COMPLETE: CPT

## 2022-12-08 LAB
LV EJECT FRACT  99904: 70
LV EJECT FRACT MOD 2C 99903: 72.82
LV EJECT FRACT MOD 4C 99902: 70.14
LV EJECT FRACT MOD BP 99901: 72.13

## 2022-12-08 PROCEDURE — 93306 TTE W/DOPPLER COMPLETE: CPT | Mod: 26 | Performed by: INTERNAL MEDICINE

## 2022-12-14 ENCOUNTER — TELEPHONE (OUTPATIENT)
Dept: HEALTH INFORMATION MANAGEMENT | Facility: OTHER | Age: 47
End: 2022-12-14
Payer: COMMERCIAL

## 2022-12-16 ENCOUNTER — OFFICE VISIT (OUTPATIENT)
Dept: MEDICAL GROUP | Facility: PHYSICIAN GROUP | Age: 47
End: 2022-12-16
Payer: COMMERCIAL

## 2022-12-16 VITALS — RESPIRATION RATE: 16 BRPM | BODY MASS INDEX: 22.13 KG/M2 | HEIGHT: 67 IN | WEIGHT: 141 LBS

## 2022-12-16 DIAGNOSIS — Z12.11 COLON CANCER SCREENING: ICD-10-CM

## 2022-12-16 DIAGNOSIS — Z01.818 TUBAL LIGATION EVALUATION: ICD-10-CM

## 2022-12-16 DIAGNOSIS — F41.9 ANXIETY: ICD-10-CM

## 2022-12-16 PROCEDURE — 99213 OFFICE O/P EST LOW 20 MIN: CPT | Performed by: NURSE PRACTITIONER

## 2022-12-16 ASSESSMENT — FIBROSIS 4 INDEX: FIB4 SCORE: 0.66

## 2022-12-16 NOTE — PROGRESS NOTES
Subjective:     CC:   Chief Complaint   Patient presents with    Follow-Up     Hypotension         HPI:   Patient is a 46 y.o. established female patient with medical history listed below here today for follow up on hypotension, anxiety, left hip pain. She is also requesting referral to GYN for tubal ligation.    Problem   Left Hip Pain    HPI from 8/19/2022 visit:  Mild dull ache in left hip the past 2 months, radiating down left thigh; Worse with prolonged standing. Very mild relief with aleve & topical analgesics. Has tried heat/ice.  she has been more active since April, low impact exercises with stretches, light weights, stationary bike.  ----  Referral to physiatry placed at last visit. She met with Dr Maier. Had positive diagnostic IJ block & was referred to ortho. Pt states hip pain got better so she did not follow up with any further referrals.     Anxiety    HPI from 2/16/2022 visit:  States she has excessive sweating related to anxiety; hx postpartum depression after all 4 pregnancies; was on celexa and stopped about 6 years ago after last child; currently using benadryl about 5 nights each week for anxiety related insomnia  -----  At our last visit, we switched from benadryl to prn hydroxyzine, states doing better with this. She also started therapy at [facility] once a week     Heart palpitations    HPI from 9/16/2022 visit:  Reports palpitations since 2017, at first thought related to anxiety. She is now noticing low BP readings at home with the palpitations, averaging 80s/40s on most days. She started taking electrolyte replacement the past week & had some improvement with the palpitations, low BP is persisting. Denies CP, SOB, edema. Had some dizziness in the past.   -----  Referral to vascular placed at last visit. She met with Dr Bloch, had echo which was normal. She also wore holter monitor and has upcoming visit with vascular to review.          Patient Active Problem List  "  Diagnosis    Heart palpitations    History of HPV infection    Photosensitivity dermatitis    Well woman exam with routine gynecological exam    Encounter for sterilization    Lump of skin of back    Left foot pain    Anxiety    Left hip pain    Dizziness       Past Medical History:   Diagnosis Date    Anesthesia     hypotension with epidural    Anxiety     Depression     anxiety        Past Surgical History:   Procedure Laterality Date    HEMORRHOIDECTOMY  9/11/2019    Procedure: HEMORRHOIDECTOMY - SINGLE QUADRANT;  Surgeon: Garcia Guzman M.D.;  Location: SURGERY [facility];  Service: General    DILATION AND CURETTAGE      PRIMARY C SECTION          Current Outpatient Medications on File Prior to Visit   Medication Sig Dispense Refill    Calcium-Magnesium-Vitamin D (CALCIUM MAGNESIUM PO) Take  by mouth.      Cod Liver Oil 1000 MG Cap Take  by mouth.      Cholecalciferol (VITAMIN D3) 30 MCG/15ML Liquid Take  by mouth.      Multiple Vitamins-Minerals (AIRBORNE PO) Take  by mouth.      Acetylcysteine (N-ACETYL-L-CYSTEINE PO) Take  by mouth.      hydrOXYzine HCl (ATARAX) 25 MG Tab TAKE 1 TABLET BY MOUTH TWICE A DAY AS NEEDED FOR ANXIETY 180 Tablet 0    ibuprofen (MOTRIN) 200 MG Tab Take 200 mg by mouth every 6 hours as needed for Mild Pain.      Multiple Vitamins-Minerals (MULTIVITAMIN PO) Take 1 Tab by mouth every day.       No current facility-administered medications on file prior to visit.      Objective:     Exam:  BP (P) 110/72   Pulse (P) 76   Temp (P) 37 °C (98.6 °F) (Tympanic)   Resp 16   Ht 1.708 m (5' 7.25\")   Wt 64 kg (141 lb)   SpO2 (P) 98%   BMI 21.92 kg/m²  Body mass index is 21.92 kg/m².    Physical Exam  Constitutional:       Appearance: Normal appearance.   Cardiovascular:      Rate and Rhythm: Normal rate and regular rhythm.      Pulses: Normal pulses.      Heart sounds: Normal heart sounds.   Pulmonary:      Effort: Pulmonary effort is normal.      Breath sounds: Normal breath sounds. "   Musculoskeletal:      Right lower leg: No edema.      Left lower leg: No edema.   Skin:     General: Skin is warm and dry.   Neurological:      General: No focal deficit present.      Mental Status: She is alert and oriented to person, place, and time.   Psychiatric:         Mood and Affect: Mood normal.         Behavior: Behavior normal.         Thought Content: Thought content normal.         Judgment: Judgment normal.       Assessment & Plan:     46 y.o. female with the following -     Problem List Items Addressed This Visit       Anxiety     Chronic condition; stable on current management; denies SI/self harm  - continue prn hydroxyzine; using a few times each week. Discussed adding daily SSRI, she doesn't feel like she needs this now.   - continue weekly therapy at [facility]          Other Visit Diagnoses       Tubal ligation evaluation        New GYN referral for 2nd opinion on tubal ligation; saw Dr Del Toro who is no longer in practice    Relevant Orders    Referral to Gynecology    Colon cancer screening        Relevant Orders    COLOGUARD (FIT DNA)          Educated in proper administration of medication(s) ordered today including safety, possible SE, risks, benefits, rationale and alternatives to therapy.     Return in about 6 months (around 6/16/2023) for Annual Visit.    Please note that this dictation was created using voice recognition software. I have made every reasonable attempt to correct obvious errors, but I expect that there are errors of grammar and possibly content that I did not discover before finalizing the note.

## 2022-12-21 NOTE — ASSESSMENT & PLAN NOTE
Chronic condition; stable on current management; denies SI/self harm  - continue prn hydroxyzine; using a few times each week. Discussed adding daily SSRI, she doesn't feel like she needs this now.   - continue weekly therapy at [facility]

## 2023-01-09 ENCOUNTER — OFFICE VISIT (OUTPATIENT)
Dept: VASCULAR LAB | Facility: MEDICAL CENTER | Age: 48
End: 2023-01-09
Attending: INTERNAL MEDICINE
Payer: COMMERCIAL

## 2023-01-09 VITALS
HEART RATE: 76 BPM | HEIGHT: 67 IN | BODY MASS INDEX: 22.76 KG/M2 | WEIGHT: 145 LBS | DIASTOLIC BLOOD PRESSURE: 66 MMHG | SYSTOLIC BLOOD PRESSURE: 102 MMHG

## 2023-01-09 DIAGNOSIS — R00.2 PALPITATIONS: ICD-10-CM

## 2023-01-09 PROCEDURE — 99212 OFFICE O/P EST SF 10 MIN: CPT

## 2023-01-09 PROCEDURE — 99213 OFFICE O/P EST LOW 20 MIN: CPT | Performed by: INTERNAL MEDICINE

## 2023-01-09 ASSESSMENT — FIBROSIS 4 INDEX: FIB4 SCORE: 0.67

## 2023-01-09 NOTE — PROGRESS NOTES
"VASCULAR MEDICINE CLINIC - Followup VISIT  23    Maribel Romo is a 47 y.o.  female who presents today  for cardiovascular evaluation     Subjective      HPI:  Here for f/u of heart palpitations and low blood pressure  Has decreased caffeine and alcohol  Doing more guided breathing  Following up with counselor  Palpitations and lightheadedness still present but less severe and less frequent  No further headache  When she does feel lightheaded she increases her electrolytes  No syncope or presyncope       Social History     Tobacco Use    Smoking status: Former     Years: 4.00     Types: Cigarettes     Quit date: 2004     Years since quittin.3    Smokeless tobacco: Never   Vaping Use    Vaping Use: Never used   Substance Use Topics    Alcohol use: Yes     Alcohol/week: 1.8 oz     Types: 3 Glasses of wine per week    Drug use: No        DIET AND EXERCISE:  Weight Change: Stable  Diet: Very heart healthy  Exercise: moderate regular exercise program           Objective     Objective:     Vitals:    23 1531   BP: 102/66   BP Location: Left arm   Patient Position: Sitting   BP Cuff Size: Adult   Pulse: 76   Weight: 65.8 kg (145 lb)   Height: 1.702 m (5' 7\")        Physical Exam  Vitals reviewed.   Constitutional:       General: She is not in acute distress.     Appearance: She is not diaphoretic.   HENT:      Head: Normocephalic and atraumatic.   Eyes:      General: No scleral icterus.     Conjunctiva/sclera: Conjunctivae normal.   Neck:      Vascular: No carotid bruit.   Cardiovascular:      Rate and Rhythm: Normal rate and regular rhythm.      Heart sounds: Normal heart sounds. No murmur heard.  Pulmonary:      Effort: Pulmonary effort is normal. No respiratory distress.      Breath sounds: Normal breath sounds. No wheezing or rales.   Musculoskeletal:      Right lower leg: No edema.      Left lower leg: No edema.   Skin:     Coloration: Skin is not pale.   Neurological:      General: " No focal deficit present.      Mental Status: She is alert and oriented to person, place, and time.      Cranial Nerves: No cranial nerve deficit.      Coordination: Coordination normal.      Gait: Gait is intact. Gait normal.   Psychiatric:         Mood and Affect: Mood and affect normal.         Behavior: Behavior normal.        DATA REVIEW    Lab Results   Component Value Date/Time    CHOLSTRLTOT 191 04/07/2022 06:29 AM    LDL 85 04/07/2022 06:29 AM    HDL 97 04/07/2022 06:29 AM    TRIGLYCERIDE 45 04/07/2022 06:29 AM   TSH normal  Plasma free metanephrines normal    Lab Results   Component Value Date/Time    SODIUM 137 10/14/2022 06:59 AM    POTASSIUM 4.4 10/14/2022 06:59 AM    CHLORIDE 105 10/14/2022 06:59 AM    CO2 25 10/14/2022 06:59 AM    GLUCOSE 86 10/14/2022 06:59 AM    BUN 14 10/14/2022 06:59 AM    CREATININE 0.81 10/14/2022 06:59 AM     Lab Results   Component Value Date/Time    ALKPHOSPHAT 52 10/14/2022 06:59 AM    ASTSGOT 14 10/14/2022 06:59 AM    ALTSGPT 20 10/14/2022 06:59 AM    TBILIRUBIN 1.0 10/14/2022 06:59 AM       No results found for: HBA1C    No results found for: MICROALBCALC, MALBCRT, MALBEXCR, HWROAX16, MICROALBUR, MICRALB, UMICROALBUM, MICROALBTIM      Echocardiogram December 2022  Normal left ventricular size, thickness, systolic function, and   diastolic function.  The left ventricular ejection fraction is visually estimated to be 65-  70%.  Normal right ventricular size and systolic function.  Estimated right ventricular systolic pressure is 23 mmHg.  Normal left atrial size.  No significant valvular abnormalities.   Normal inferior vena cava size and inspiratory collapse.    Bio tell 2022  Few short episodes of SVT, couple of which correlate with symptoms          Medical Decision Making:  Today's Assessment / Status / Plan:     1. Heart palpitations             ASSESSMENT:    Palpitations/SVT  Self-limiting.  No syncope.  No associated symptoms.  Often associated with anxiety.   Generally improved with lifestyle modification.  No evidence of structural heart disease.  No evidence of endocrine disorder.  Using shared decision-making, we will continue with lifestyle modification alone.  Patient to call if has increase in severity, frequency, or duration    2.  Intermittent lightheadedness with relative hypotension-not associated with palpitations.  Mostly associated with orthostatic changes or stressful situations.  Perhaps a bit of vasodepressor phenomenon.  Once again symptoms are relatively mild and if anything improved with lifestyle modification    3.  Possible atypical migraine -she gets ringing in both ears after sex or vigorous exercise.  She has a family history of migraine.  Would not work-up further at this time    PLAN:    -Continue increase sodium intake, particularly at night  -Continue regular guided breathing  -Continue to follow-up with counselor  -Continue excellent diet and exercise habits  -Continue to avoid caffeine and alcohol  -Continue increased electrolytes from lightheaded  -Call if any worsening of symptoms as above    Instructed to follow-up with PCP for remainder of adult medical needs: yes  We will partner with other providers in the management of established vascular disease and cardiometabolic risk factors.    Studies to Be Obtained: None  Labs to Be Obtained: None    Follow up in: 3 months    Michael J Bloch, M.D.     Cc: MIRNA Graham

## 2023-03-29 ENCOUNTER — OFFICE VISIT (OUTPATIENT)
Dept: MEDICAL GROUP | Facility: PHYSICIAN GROUP | Age: 48
End: 2023-03-29
Payer: COMMERCIAL

## 2023-03-29 VITALS
SYSTOLIC BLOOD PRESSURE: 102 MMHG | BODY MASS INDEX: 22.44 KG/M2 | HEIGHT: 67 IN | TEMPERATURE: 97.7 F | WEIGHT: 143 LBS | DIASTOLIC BLOOD PRESSURE: 68 MMHG | OXYGEN SATURATION: 100 %

## 2023-03-29 DIAGNOSIS — Z00.00 ENCOUNTER FOR PREVENTATIVE ADULT HEALTH CARE EXAMINATION: ICD-10-CM

## 2023-03-29 DIAGNOSIS — D22.9 NUMEROUS SKIN MOLES: ICD-10-CM

## 2023-03-29 DIAGNOSIS — Z13.228 SCREENING FOR METABOLIC DISORDER: ICD-10-CM

## 2023-03-29 DIAGNOSIS — Z12.11 COLON CANCER SCREENING: ICD-10-CM

## 2023-03-29 DIAGNOSIS — E55.9 VITAMIN D DEFICIENCY: ICD-10-CM

## 2023-03-29 DIAGNOSIS — F41.9 ANXIETY: ICD-10-CM

## 2023-03-29 PROCEDURE — 99396 PREV VISIT EST AGE 40-64: CPT | Performed by: NURSE PRACTITIONER

## 2023-03-29 ASSESSMENT — PATIENT HEALTH QUESTIONNAIRE - PHQ9: CLINICAL INTERPRETATION OF PHQ2 SCORE: 0

## 2023-03-29 ASSESSMENT — FIBROSIS 4 INDEX: FIB4 SCORE: 0.67

## 2023-03-29 NOTE — ASSESSMENT & PLAN NOTE
Chronic condition; stable on current management; denies SI/self harm  - continue prn hydroxyzine; using a few times each month. Discussed adding daily SSRI at last visit, she doesn't feel like she needs this now.   - continue weekly therapy at [facility]

## 2023-03-29 NOTE — ASSESSMENT & PLAN NOTE
Low vit D in the past. She is taking daily MVI + vit D drops.  - monitor vit D levels with annual labs

## 2023-03-29 NOTE — PROGRESS NOTES
CC:Diagnoses of Anxiety, Vitamin D deficiency, Numerous skin moles, Colon cancer screening, Screening for metabolic disorder, and Encounter for preventative adult health care examination were pertinent to this visit.    Maribel Romo is a 47 y.o. female here for a routine preventive health exam. Patient has no active complaints today and is doing well. She needs referral to see derm for head to toe assessment of her skin moles.   She was seen a few weeks ago 3/3/2023 in ER for abdominal pain and bloody stools. Colitis noted on CT, discharged same day on azithromycin. Her bloody stools subsided on day 3 so she did not take the antibiotics. She is currently asymptomatic.     Health Maintenance: Completed    Ob-Gyn/ History:    /Para:  M2  Last Pap Smear:  2020, NILM, -ve HPV. No history of abnormal pap smears.  Gyn Surgery:  , D&C.  Current Contraceptive Method:  none; considering tubal ligation. Currently sexually active.  Last menstrual period:  3/2023.  Periods regular  Post-menopausal bleeding: n/a  Urinary incontinence: none  Folate intake: none     Screening/Preventative Topics:  Advanced directive: none currently   Osteoporosis Screen/ DEXA: n/a; start age 65   Diabetes Screening: n/a; BMI 22.23, FBG trending 80s  AAA Screening: n/a  Aspirin Use: no    Diet: heart healthy eating; avoid processed foods; prepare meals with fresh, whole ingredients   Exercise: recommend aerobic and resistance training 3-5x/week   Screen for depression: PHQ-2: 0/0; she is in therapy for anxiety   Substance Abuse: denies  Safe in relationship   Sun protection used.    Cancer screening  Colorectal Cancer Screening: reordered cologuard kit today; she did not receive it in 2022    Lung Cancer Screening: n/a; quit smoking     Cervical Cancer Screenin2020; NILM, -ve HPV; recall in 5 years   Breast Cancer Screening: 3/2021; recall in 2 years     Infectious disease screening  --STI  Screening: delined   --Practices safe sex.  --HIV Screening: declined   --Syphilis Screening: declined  --Hepatitis C Screening: low risk; declined    Immunization History   Administered Date(s) Administered    Influenza Vaccine Quad Inj (Pf) 2018, 10/17/2019, 2020    QUANTIFERON GOLD - HISTORICAL DATA 2020    Tdap Vaccine 2020          Patient Active Problem List    Diagnosis Date Noted    Vitamin D deficiency 2023    Left hip pain 2022    Dizziness 2022    Anxiety 2022    Left foot pain 2021    Encounter for sterilization 2021    Lump of skin of back 2021    Well woman exam with routine gynecological exam 2018    Heart palpitations 2017    History of HPV infection 2017    Photosensitivity dermatitis 2017      Allergies:Caffeine and Keflex    Current Outpatient Medications   Medication Sig Dispense Refill    Calcium-Magnesium-Vitamin D (CALCIUM MAGNESIUM PO) Take  by mouth.      Cod Liver Oil 1000 MG Cap Take  by mouth.      Cholecalciferol (VITAMIN D3) 30 MCG/15ML Liquid Take  by mouth.      Multiple Vitamins-Minerals (AIRBORNE PO) Take  by mouth.      Acetylcysteine (N-ACETYL-L-CYSTEINE PO) Take  by mouth.      hydrOXYzine HCl (ATARAX) 25 MG Tab TAKE 1 TABLET BY MOUTH TWICE A DAY AS NEEDED FOR ANXIETY 180 Tablet 0    ibuprofen (MOTRIN) 200 MG Tab Take 200 mg by mouth every 6 hours as needed for Mild Pain.      Multiple Vitamins-Minerals (MULTIVITAMIN PO) Take 1 Tab by mouth every day.       No current facility-administered medications for this visit.       Social History     Tobacco Use    Smoking status: Former     Years: 4.00     Types: Cigarettes     Quit date: 2004     Years since quittin.5    Smokeless tobacco: Never   Vaping Use    Vaping Use: Never used   Substance Use Topics    Alcohol use: Yes     Alcohol/week: 1.8 oz     Types: 3 Glasses of wine per week    Drug use: No     Social History     Social  "History Narrative    Not on file       Family History   Problem Relation Age of Onset    Hyperlipidemia Mother     No Known Problems Father     Thyroid Sister 38        hashimoto thyroiditis    Breast Cancer Paternal Aunt        Review of Systems:    Constitutional: No Fevers, Chills  Eyes: No vision changes  ENT: No hearing changes  Resp: No Shortness of breath  CV: No Chest pain  GI: No Nausea/Vomiting  MSK: No weakness  Skin: No rashes  Neuro: No Headaches  Psych: No Suicidal ideations    All remaining systems reviewed and found to be negative, except as stated above.    Exam:    /68   Temp 36.5 °C (97.7 °F) (Temporal)   Ht 1.708 m (5' 7.25\")   Wt 64.9 kg (143 lb)   SpO2 100%  Body mass index is 22.23 kg/m².    General:  Well nourished, well developed female in NAD  HENT: Atraumatic, normocephalic  EYES: Extraocular movements intact, pupils equal and reactive to light  NECK: Supple, FROM  CHEST: No deformities, Equal chest expansion  BREAST:  Symmetrical without masses. No nipple discharge. Areas of dense tissue noted on both breasts.   RESP: Unlabored, no stridor or audible wheeze  ABD: Soft, Nontender, Non-Distended  : deferred  Extremities: No Clubbing, Cyanosis, or Edema  Skin: Warm/dry, without rashes  Neuro: A/O x 4, CN 2-12 Grossly intact, Motor/sensory grossly intact  Psych: Normal behavior, normal affect    LABS: Results reviewed (from ER visit 3/3/2023) and discussed with the patient, questions answered. Add on fasting lipid, thyroid, vit D level for annual labs.       Assessment/Plan:  1. Anxiety  Chronic condition; stable on current management; denies SI/self harm  - continue prn hydroxyzine; using a few times each month. Discussed adding daily SSRI at last visit, she doesn't feel like she needs this now.   - continue weekly therapy at [facility]    2. Vitamin D deficiency  Low vit D in the past. She is taking daily MVI + vit D drops.  - monitor vit D levels with annual labs    - VITAMIN " D,25 HYDROXY (DEFICIENCY); Future    3. Numerous skin moles  She has skin moles around her temples and scattered across her back. No hx of mohs. She would like to have head to toe skin assessment by derm.     - Referral to Dermatology    4. Colon cancer screening  Brief episode of colitis with bloody stools x 3days. Now resolved, asymptomatic. She would like to get cologuard for first colon cancer screening. No family history of colon cancer. Did not receive kit last year, will reorder today.    - COLOGUARD (FIT DNA)    5. Screening for metabolic disorder  - TSH WITH REFLEX TO FT4; Future  - Lipid Profile; Future    6. Encounter for preventative adult health care examination  Patient up-to-date on preventative health maintenance examinations and vaccinations. Age-appropriate anticipatory guidance provided today. Counseling about diet, supplements, exercise, skin care and safe sex.    Preventive visit in 1 year, sooner as needed for any concerns.     Please note that this dictation was created using voice recognition software. I have worked with consultants from the vendor as well as technical experts from [employer] to optimize the interface. I have made every reasonable attempt to correct obvious errors, but I expect that there are errors of grammar and possibly content that I did not discover before finalizing the note.

## 2023-05-15 ENCOUNTER — HOSPITAL ENCOUNTER (OUTPATIENT)
Dept: LAB | Facility: MEDICAL CENTER | Age: 48
End: 2023-05-15
Attending: NURSE PRACTITIONER
Payer: COMMERCIAL

## 2023-05-15 DIAGNOSIS — Z13.228 SCREENING FOR METABOLIC DISORDER: ICD-10-CM

## 2023-05-15 DIAGNOSIS — E55.9 VITAMIN D DEFICIENCY: ICD-10-CM

## 2023-05-15 LAB
25(OH)D3 SERPL-MCNC: 70 NG/ML (ref 30–100)
CHOLEST SERPL-MCNC: 181 MG/DL (ref 100–199)
FASTING STATUS PATIENT QL REPORTED: NORMAL
HDLC SERPL-MCNC: 92 MG/DL
LDLC SERPL CALC-MCNC: 83 MG/DL
TRIGL SERPL-MCNC: 31 MG/DL (ref 0–149)
TSH SERPL DL<=0.005 MIU/L-ACNC: 1.97 UIU/ML (ref 0.38–5.33)

## 2023-05-15 PROCEDURE — 80061 LIPID PANEL: CPT

## 2023-05-15 PROCEDURE — 82306 VITAMIN D 25 HYDROXY: CPT

## 2023-05-15 PROCEDURE — 36415 COLL VENOUS BLD VENIPUNCTURE: CPT

## 2023-05-15 PROCEDURE — 84443 ASSAY THYROID STIM HORMONE: CPT

## 2023-07-03 ENCOUNTER — OFFICE VISIT (OUTPATIENT)
Dept: DERMATOLOGY | Facility: IMAGING CENTER | Age: 48
End: 2023-07-03
Payer: COMMERCIAL

## 2023-07-03 DIAGNOSIS — Z12.83 SKIN CANCER SCREENING: ICD-10-CM

## 2023-07-03 DIAGNOSIS — D18.01 CHERRY ANGIOMA: ICD-10-CM

## 2023-07-03 DIAGNOSIS — D22.9 MULTIPLE NEVI: ICD-10-CM

## 2023-07-03 DIAGNOSIS — L81.4 LENTIGINES: ICD-10-CM

## 2023-07-03 PROCEDURE — 99212 OFFICE O/P EST SF 10 MIN: CPT | Performed by: NURSE PRACTITIONER

## 2023-07-03 NOTE — PROGRESS NOTES
"DERMATOLOGY NOTE  NEW VISIT       Chief complaint: Establish Care (HOME)  Some moles that have \"funny\" colors in them    History of skin cancer: No  History of precancers/actinic keratoses: No  History of biopsies:Yes, Details: Benign   History of blistering/severe sunburns:Yes, Details: Adult  Family history of skin cancer:Yes, Details: Paternal grandfather unsure type  Family history of atypical moles:Yes, Details: Mother      Allergies   Allergen Reactions    Caffeine Palpitations    Keflex Rash and Itching     ALL OVER BODY RASH, SCRATCHY THROAT        MEDICATIONS:  Medications relevant to specialty reviewed.     REVIEW OF SYSTEMS:   Positive for skin (see HPI)  Negative for fevers and chills       EXAM:  There were no vitals taken for this visit.  Constitutional: Well-developed, well-nourished, and in no distress.     A total body skin exam was performed excluding the genitals per patient preference and including the following areas: head (including face), neck, chest, abdomen, groin/buttocks, back, bilateral upper extremities, and bilateral lower extremities with the following pertinent findings listed below and/or in assessment/plan.     -sun exposed skin of trunk and b/l upper, lower extremities and face with scattered clinically benign light brown reticulated macules all of which were morphologically similar and none of which were suspicious for skin cancer today on exam    -Several scattered 1-3mm bright red macules and thin papules on the trunk and extremities    -Multiple tan medium brown skin-colored macules papules scattered over the trunk, face and extremities--All with benign-appearing pigment network patterns on dermoscopy      IMPRESSION / PLAN:    Skin cancer education  discussed importance of sun protective clothing, eyewear in addition to the use of broad spectrum sunscreen with SPF 30 or greater, as well as need for reapplication ~every 2 hours when exposed to UVR/handout given  discussed " importance following up for any new or changing lesions as noted in handout given, but every 12 months exams in clinic in the setting of dermatologic history  ABCDE's of melanoma discussed/handout given          Please note that this dictation was created using voice recognition software. I have made every reasonable attempt to correct obvious errors, but I expect that there are errors of grammar and possibly content that I did not discover before finalizing the note.      Return to clinic in: Return in about 1 year (around 7/3/2024) for HOME. and as needed for any new or changing skin lesions.

## 2023-07-18 NOTE — OP REPORT
DATE OF SERVICE:  09/11/2019    PREOPERATIVE DIAGNOSES:  1.  Large recurrent symptomatic recurrent thrombosed hemorrhoid.  2.  Smaller additional hemorrhoids.    OPERATION PERFORMED:  1.  Examination under anesthesia.  2.  Proctoscopy.  3.  Single quadrant hemorrhoidectomy, internal and external.  4.  Oversew imbrication of smaller internal hemorrhoid columns.    SURGEON:  Garcia Guzman MD    ANESTHESIOLOGIST:  Henry Clark MD    INDICATIONS FOR PROCEDURE:  The patient has had 22 years of symptoms related   to what may be the same hemorrhoid complex that has had recurrent thrombosis.    She has other smaller hemorrhoids on exam, but this quite large complex has   been the primary culprit.  She comes today for single quadrant   hemorrhoidectomy with possible treatment of additional hemorrhoids with the   plan to be conservative according to her wishes.    DETAILS OF PROCEDURE:  After an extensive informed consent discussion process,   the patient was brought to the operating room.  She was placed in a supine   position on the operating table.  After induction of general anesthesia, she   was repositioned into lithotomy with Yellofin stirrups, sequential compression   stockings and appropriate padding.  The anorectal region was prepped and   draped in the usual fashion.  After administration of intravenous antibiotics,   a careful examination under anesthesia was performed.  The large obvious   complex was present with some old thrombosis present.  Much smaller   nonsurgical external hemorrhoids were present.  Proctoscopy and Hill-Siegel   anoscopy demonstrated internal hemorrhoids in the right posterolateral   quadrant and left anterolateral sector.  The large column that was recurrent   thrombosis was to the left of midline, quite posteriorly.    Bupivacaine with epinephrine was generously infiltrated.  A scalpel was used   to excise the large column with care taken to minimize the anodermal excision.    The  column was then dissected and all the fibers of the internal anal   sphincter mechanism were swept outward and carefully preserved.  All the old   thrombosis was evacuated.  The column was dissected high into the anal canal   and then suture ligated at its proximal base with the specimen passed off the   field for pathology.  The defect was run closed with chromic suture.    Oversew imbrication was performed for the right posterolateral and left   anterolateral internal hemorrhoid areas.  Careful reinspection demonstrated a   nice result with no prolapse of tissue upon removal of a Ray-Raymundo and no   significant external disease.  Further generous bupivacaine along with   lidocaine ointment was applied.  Fluff dressings were applied.  Needle, sponge   and instrument counts were correct.    ESTIMATED BLOOD LOSS:  Minimal.    COMPLICATIONS:  None.       ____________________________________     MD TRAVIS SILVER / EDDI    DD:  09/11/2019 09:14:50  DT:  09/11/2019 09:35:52    D#:  [id number]  Job#:  [id number]   Mirvaso Counseling: Mirvaso is a topical medication which can decrease superficial blood flow where applied. Side effects are uncommon and include stinging, redness and allergic reactions.

## 2024-04-18 ENCOUNTER — HOSPITAL ENCOUNTER (OUTPATIENT)
Dept: LAB | Facility: MEDICAL CENTER | Age: 49
End: 2024-04-18
Attending: NURSE PRACTITIONER
Payer: COMMERCIAL

## 2024-04-18 ENCOUNTER — OFFICE VISIT (OUTPATIENT)
Dept: MEDICAL GROUP | Facility: PHYSICIAN GROUP | Age: 49
End: 2024-04-18
Payer: COMMERCIAL

## 2024-04-18 ENCOUNTER — HOSPITAL ENCOUNTER (OUTPATIENT)
Dept: RADIOLOGY | Facility: MEDICAL CENTER | Age: 49
End: 2024-04-18
Attending: NURSE PRACTITIONER
Payer: COMMERCIAL

## 2024-04-18 VITALS
DIASTOLIC BLOOD PRESSURE: 60 MMHG | RESPIRATION RATE: 16 BRPM | TEMPERATURE: 100.1 F | HEIGHT: 68 IN | HEART RATE: 94 BPM | BODY MASS INDEX: 22.73 KG/M2 | WEIGHT: 150 LBS | SYSTOLIC BLOOD PRESSURE: 108 MMHG | OXYGEN SATURATION: 99 %

## 2024-04-18 DIAGNOSIS — R10.11 RIGHT UPPER QUADRANT ABDOMINAL PAIN: ICD-10-CM

## 2024-04-18 LAB
ALBUMIN SERPL BCP-MCNC: 4.5 G/DL (ref 3.2–4.9)
ALBUMIN/GLOB SERPL: 1.6 G/DL
ALP SERPL-CCNC: 54 U/L (ref 30–99)
ALT SERPL-CCNC: 16 U/L (ref 2–50)
AMYLASE SERPL-CCNC: 45 U/L (ref 20–103)
ANION GAP SERPL CALC-SCNC: 10 MMOL/L (ref 7–16)
APPEARANCE UR: CLEAR
AST SERPL-CCNC: 16 U/L (ref 12–45)
BILIRUB SERPL-MCNC: 1.9 MG/DL (ref 0.1–1.5)
BILIRUB UR QL STRIP.AUTO: NEGATIVE
BUN SERPL-MCNC: 12 MG/DL (ref 8–22)
CALCIUM ALBUM COR SERPL-MCNC: 8.7 MG/DL (ref 8.5–10.5)
CALCIUM SERPL-MCNC: 9.1 MG/DL (ref 8.5–10.5)
CHLORIDE SERPL-SCNC: 106 MMOL/L (ref 96–112)
CO2 SERPL-SCNC: 23 MMOL/L (ref 20–33)
COLOR UR: YELLOW
CREAT SERPL-MCNC: 0.77 MG/DL (ref 0.5–1.4)
ERYTHROCYTE [DISTWIDTH] IN BLOOD BY AUTOMATED COUNT: 39.8 FL (ref 35.9–50)
GFR SERPLBLD CREATININE-BSD FMLA CKD-EPI: 96 ML/MIN/1.73 M 2
GLOBULIN SER CALC-MCNC: 2.9 G/DL (ref 1.9–3.5)
GLUCOSE SERPL-MCNC: 90 MG/DL (ref 65–99)
GLUCOSE UR STRIP.AUTO-MCNC: NEGATIVE MG/DL
HCT VFR BLD AUTO: 43.5 % (ref 37–47)
HGB BLD-MCNC: 14.5 G/DL (ref 12–16)
KETONES UR STRIP.AUTO-MCNC: NEGATIVE MG/DL
LEUKOCYTE ESTERASE UR QL STRIP.AUTO: NEGATIVE
LIPASE SERPL-CCNC: 18 U/L (ref 11–82)
MCH RBC QN AUTO: 29.2 PG (ref 27–33)
MCHC RBC AUTO-ENTMCNC: 33.3 G/DL (ref 32.2–35.5)
MCV RBC AUTO: 87.5 FL (ref 81.4–97.8)
MICRO URNS: NORMAL
NITRITE UR QL STRIP.AUTO: NEGATIVE
PH UR STRIP.AUTO: 6.5 [PH] (ref 5–8)
PLATELET # BLD AUTO: 216 K/UL (ref 164–446)
PMV BLD AUTO: 10.7 FL (ref 9–12.9)
POTASSIUM SERPL-SCNC: 3.9 MMOL/L (ref 3.6–5.5)
PROT SERPL-MCNC: 7.4 G/DL (ref 6–8.2)
PROT UR QL STRIP: NEGATIVE MG/DL
RBC # BLD AUTO: 4.97 M/UL (ref 4.2–5.4)
RBC UR QL AUTO: NEGATIVE
SODIUM SERPL-SCNC: 139 MMOL/L (ref 135–145)
SP GR UR STRIP.AUTO: 1
UROBILINOGEN UR STRIP.AUTO-MCNC: 0.2 MG/DL
WBC # BLD AUTO: 5.2 K/UL (ref 4.8–10.8)

## 2024-04-18 PROCEDURE — 81003 URINALYSIS AUTO W/O SCOPE: CPT

## 2024-04-18 PROCEDURE — 82150 ASSAY OF AMYLASE: CPT

## 2024-04-18 PROCEDURE — 3078F DIAST BP <80 MM HG: CPT | Performed by: NURSE PRACTITIONER

## 2024-04-18 PROCEDURE — 85027 COMPLETE CBC AUTOMATED: CPT

## 2024-04-18 PROCEDURE — 76705 ECHO EXAM OF ABDOMEN: CPT

## 2024-04-18 PROCEDURE — 36415 COLL VENOUS BLD VENIPUNCTURE: CPT

## 2024-04-18 PROCEDURE — 3074F SYST BP LT 130 MM HG: CPT | Performed by: NURSE PRACTITIONER

## 2024-04-18 PROCEDURE — 80053 COMPREHEN METABOLIC PANEL: CPT

## 2024-04-18 PROCEDURE — 99214 OFFICE O/P EST MOD 30 MIN: CPT | Performed by: NURSE PRACTITIONER

## 2024-04-18 PROCEDURE — 83690 ASSAY OF LIPASE: CPT

## 2024-04-18 ASSESSMENT — ENCOUNTER SYMPTOMS
CONSTIPATION: 0
BLOOD IN STOOL: 0
ABDOMINAL PAIN: 1
PALPITATIONS: 0
EYES NEGATIVE: 1
SPUTUM PRODUCTION: 0
NEUROLOGICAL NEGATIVE: 1
FEVER: 0
NAUSEA: 0
HEARTBURN: 0
CONSTITUTIONAL NEGATIVE: 1
MUSCULOSKELETAL NEGATIVE: 1
VOMITING: 0
COUGH: 0
DIARRHEA: 1
SHORTNESS OF BREATH: 0

## 2024-04-18 ASSESSMENT — PATIENT HEALTH QUESTIONNAIRE - PHQ9: CLINICAL INTERPRETATION OF PHQ2 SCORE: 0

## 2024-04-18 ASSESSMENT — FIBROSIS 4 INDEX: FIB4 SCORE: 0.68

## 2024-04-18 NOTE — PROGRESS NOTES
Subjective       CC:   Chief Complaint   Patient presents with    Annual Exam    RUQ Pain     X 3 weeks        HPI:   Patient is a 48 y.o. established female patient with medical history listed below here today with concerns of right upper quadrant pain the past 3 weeks. States she had similar ache the past 10 years but it seems like its getting more intense. No vomiting or nausea or fever. She is eating more of bland diet which is helping a bit. She had some coffee yesterday which caused non-bloody loose stools. She had colitis last year, did not require antibiotics. She also noticed increased urinary urgency. No urination burning or hematuria or vaginal itching. LMP - 3/14/2024, not currently on birth control     Patient Active Problem List   Diagnosis    Heart palpitations    History of HPV infection    Photosensitivity dermatitis    Well woman exam with routine gynecological exam    Encounter for sterilization    Lump of skin of back    Left foot pain    Anxiety    Left hip pain    Dizziness    Vitamin D deficiency       Past Medical History:   Diagnosis Date    Anesthesia     hypotension with epidural    Anxiety     Depression     anxiety        Past Surgical History:   Procedure Laterality Date    HEMORRHOIDECTOMY  9/11/2019    Procedure: HEMORRHOIDECTOMY - SINGLE QUADRANT;  Surgeon: Garcia Guzman M.D.;  Location: SURGERY [facility];  Service: General    DILATION AND CURETTAGE      PRIMARY C SECTION          Current Outpatient Medications on File Prior to Visit   Medication Sig Dispense Refill    Calcium-Magnesium-Vitamin D (CALCIUM MAGNESIUM PO) Take  by mouth.      Cod Liver Oil 1000 MG Cap Take  by mouth.      Cholecalciferol (VITAMIN D3) 30 MCG/15ML Liquid Take  by mouth.      Multiple Vitamins-Minerals (AIRBORNE PO) Take  by mouth.      hydrOXYzine HCl (ATARAX) 25 MG Tab TAKE 1 TABLET BY MOUTH TWICE A DAY AS NEEDED FOR ANXIETY 180 Tablet 0    ibuprofen (MOTRIN) 200 MG Tab Take 200 mg by mouth every 6  "hours as needed for Mild Pain.      Multiple Vitamins-Minerals (MULTIVITAMIN PO) Take 1 Tab by mouth every day.       No current facility-administered medications on file prior to visit.        ROS:  Review of Systems   Constitutional: Negative.  Negative for fever and malaise/fatigue.   HENT: Negative.     Eyes: Negative.    Respiratory:  Negative for cough, sputum production and shortness of breath.    Cardiovascular:  Negative for chest pain, palpitations and leg swelling.   Gastrointestinal:  Positive for abdominal pain and diarrhea. Negative for blood in stool, constipation, heartburn, melena, nausea and vomiting.   Genitourinary:  Positive for urgency.   Musculoskeletal: Negative.    Neurological: Negative.    Endo/Heme/Allergies: Negative.        Objective       Exam:  /60 (BP Location: Left arm, Patient Position: Sitting, BP Cuff Size: Adult)   Pulse 94   Temp 37.8 °C (100.1 °F)   Resp 16   Ht 1.72 m (5' 7.7\")   Wt 68 kg (150 lb)   SpO2 99%   BMI 23.01 kg/m²  Body mass index is 23.01 kg/m².    Physical Exam  Constitutional:       Appearance: Normal appearance.   Cardiovascular:      Rate and Rhythm: Normal rate and regular rhythm.      Pulses: Normal pulses.      Heart sounds: Normal heart sounds.   Pulmonary:      Effort: Pulmonary effort is normal.      Breath sounds: Normal breath sounds.   Abdominal:      General: Abdomen is flat. Bowel sounds are normal.      Palpations: Abdomen is soft.      Tenderness: There is abdominal tenderness in the right upper quadrant. There is no right CVA tenderness, left CVA tenderness or guarding.       Musculoskeletal:      Right lower leg: No edema.      Left lower leg: No edema.   Skin:     General: Skin is warm and dry.   Neurological:      Mental Status: She is alert.           Assessment & Plan       48 y.o. female with the following -   1. Right upper quadrant abdominal pain  Pain in right upper abd worsening the past 3 weeks. Seems to subside with " bland diet. Had loose stools with caffeine yesterday. Fairly normal physical exam today, some tenderness with palpation of RUQ abdomen; no jaundice. We will get basic labs to include pancreatic enzymes, UA, and US to assess liver/gall bladder.  She is to continue with bland diet for now, can trial slow introduction of regular foods if asymptomatic. Will follow up with results via [facility].      - CBC WITHOUT DIFFERENTIAL; Future  - Comp Metabolic Panel; Future  - LIPASE; Future  - AMYLASE; Future  - URINALYSIS,CULTURE IF INDICATED; Future  - US-RUQ; Future      Educated in proper administration of medication(s) ordered today including safety, possible SE, risks, benefits, rationale and alternatives to therapy.     Return if symptoms worsen or fail to improve.    Please note that this dictation was created using voice recognition software. I have made every reasonable attempt to correct obvious errors, but I expect that there are errors of grammar and possibly content that I did not discover before finalizing the note.

## 2024-05-03 ENCOUNTER — HOSPITAL ENCOUNTER (OUTPATIENT)
Dept: LAB | Facility: MEDICAL CENTER | Age: 49
End: 2024-05-03
Attending: FAMILY MEDICINE
Payer: COMMERCIAL

## 2024-05-03 ENCOUNTER — HOSPITAL ENCOUNTER (OUTPATIENT)
Facility: MEDICAL CENTER | Age: 49
End: 2024-05-03
Attending: CHIROPRACTOR
Payer: COMMERCIAL

## 2024-05-03 LAB
ALBUMIN SERPL BCP-MCNC: 4.8 G/DL (ref 3.2–4.9)
ALP SERPL-CCNC: 55 U/L (ref 30–99)
ALT SERPL-CCNC: 11 U/L (ref 2–50)
AST SERPL-CCNC: 14 U/L (ref 12–45)
BASOPHILS # BLD AUTO: 0.6 % (ref 0–1.8)
BASOPHILS # BLD: 0.03 K/UL (ref 0–0.12)
BILIRUB CONJ SERPL-MCNC: <0.2 MG/DL (ref 0.1–0.5)
BILIRUB INDIRECT SERPL-MCNC: NORMAL MG/DL (ref 0–1)
BILIRUB SERPL-MCNC: 1 MG/DL (ref 0.1–1.5)
BUN SERPL-MCNC: 13 MG/DL (ref 8–22)
CALCIUM ALBUM COR SERPL-MCNC: 8.8 MG/DL (ref 8.5–10.5)
CALCIUM SERPL-MCNC: 9.4 MG/DL (ref 8.5–10.5)
CHLORIDE SERPL-SCNC: 102 MMOL/L (ref 96–112)
CO2 SERPL-SCNC: 24 MMOL/L (ref 20–33)
CREAT SERPL-MCNC: 0.73 MG/DL (ref 0.5–1.4)
EOSINOPHIL # BLD AUTO: 0.01 K/UL (ref 0–0.51)
EOSINOPHIL NFR BLD: 0.2 % (ref 0–6.9)
ERYTHROCYTE [DISTWIDTH] IN BLOOD BY AUTOMATED COUNT: 40.9 FL (ref 35.9–50)
FERRITIN SERPL-MCNC: 49.5 NG/ML (ref 10–291)
GFR SERPLBLD CREATININE-BSD FMLA CKD-EPI: 102 ML/MIN/1.73 M 2
GGT SERPL-CCNC: 10 U/L (ref 7–34)
GLUCOSE SERPL-MCNC: 77 MG/DL (ref 65–99)
HCT VFR BLD AUTO: 43.9 % (ref 37–47)
HCYS SERPL-SCNC: 12.77 UMOL/L
HGB BLD-MCNC: 14.4 G/DL (ref 12–16)
IMM GRANULOCYTES # BLD AUTO: 0.01 K/UL (ref 0–0.11)
IMM GRANULOCYTES NFR BLD AUTO: 0.2 % (ref 0–0.9)
IRON SATN MFR SERPL: 17 % (ref 15–55)
IRON SERPL-MCNC: 48 UG/DL (ref 40–170)
LACTOFERRIN STL QL IA: NEGATIVE
LYMPHOCYTES # BLD AUTO: 1.16 K/UL (ref 1–4.8)
LYMPHOCYTES NFR BLD: 22.7 % (ref 22–41)
MCH RBC QN AUTO: 29.1 PG (ref 27–33)
MCHC RBC AUTO-ENTMCNC: 32.8 G/DL (ref 32.2–35.5)
MCV RBC AUTO: 88.7 FL (ref 81.4–97.8)
MONOCYTES # BLD AUTO: 0.29 K/UL (ref 0–0.85)
MONOCYTES NFR BLD AUTO: 5.7 % (ref 0–13.4)
NEUTROPHILS # BLD AUTO: 3.6 K/UL (ref 1.82–7.42)
NEUTROPHILS NFR BLD: 70.6 % (ref 44–72)
NRBC # BLD AUTO: 0 K/UL
NRBC BLD-RTO: 0 /100 WBC (ref 0–0.2)
PHOSPHATE SERPL-MCNC: 2.8 MG/DL (ref 2.5–4.5)
PLATELET # BLD AUTO: 218 K/UL (ref 164–446)
PMV BLD AUTO: 11.7 FL (ref 9–12.9)
POTASSIUM SERPL-SCNC: 3.9 MMOL/L (ref 3.6–5.5)
PROT SERPL-MCNC: 7.6 G/DL (ref 6–8.2)
RBC # BLD AUTO: 4.95 M/UL (ref 4.2–5.4)
SODIUM SERPL-SCNC: 139 MMOL/L (ref 135–145)
T3FREE SERPL-MCNC: 2.22 PG/ML (ref 2–4.4)
T4 FREE SERPL-MCNC: 1.56 NG/DL (ref 0.93–1.7)
THYROPEROXIDASE AB SERPL-ACNC: 10 IU/ML (ref 0–9)
TIBC SERPL-MCNC: 287 UG/DL (ref 250–450)
TSH SERPL DL<=0.005 MIU/L-ACNC: 1.56 UIU/ML (ref 0.38–5.33)
UIBC SERPL-MCNC: 239 UG/DL (ref 110–370)
WBC # BLD AUTO: 5.1 K/UL (ref 4.8–10.8)

## 2024-05-04 LAB
EST. AVERAGE GLUCOSE BLD GHB EST-MCNC: 103 MG/DL
HBA1C MFR BLD: 5.2 % (ref 4–5.6)

## 2024-05-05 LAB
MITOCHONDRIA M2 IGG SER-ACNC: 4.3 UNITS (ref 0–24.9)
NUCLEAR IGG SER QL IA: NORMAL
SMA IGG SER-ACNC: 12 UNITS (ref 0–19)
THYROGLOB AB SERPL-ACNC: <0.9 IU/ML (ref 0–4)

## 2024-05-06 LAB
HISTAMINE BLD-SCNC: 462 NMOL/L (ref 180–1800)
LKM AB TITR SER IF: NORMAL {TITER}
PCA IGG SER-ACNC: 1.4 UNITS (ref 0–24.9)
TRYPTASE SERPL-MCNC: 4.8 UG/L

## 2024-05-07 LAB
ALBUMIN SERPL ELPH-MCNC: 4.55 G/DL (ref 3.75–5.01)
ALPHA1 GLOB SERPL ELPH-MCNC: 0.29 G/DL (ref 0.19–0.46)
ALPHA2 GLOB SERPL ELPH-MCNC: 0.64 G/DL (ref 0.48–1.05)
B-GLOBULIN SERPL ELPH-MCNC: 0.79 G/DL (ref 0.48–1.1)
CALPROTECTIN STL-MCNT: 19 UG/G
EER PROT ELECT SER Q1092: NORMAL
ELASTASE PANC STL-MCNT: >800 UG/G
GAMMA GLOB SERPL ELPH-MCNC: 1.23 G/DL (ref 0.62–1.51)
IF BLOCK AB SER QL RIA: NEGATIVE
INTERPRETATION SERPL IFE-IMP: NORMAL
MONOCLONAL PROTEIN NL11656: NORMAL G/DL
PROT SERPL-MCNC: 7.5 G/DL (ref 6.3–8.2)

## 2024-05-09 LAB — IMM ASSAY OCC BLD FITOB: NEGATIVE

## 2024-05-11 LAB — HISTAMINE SERPL-SCNC: <8 NMOL/L (ref 0–8)

## 2024-05-21 ENCOUNTER — DOCUMENTATION (OUTPATIENT)
Dept: HEALTH INFORMATION MANAGEMENT | Facility: OTHER | Age: 49
End: 2024-05-21
Payer: COMMERCIAL

## 2024-06-12 ENCOUNTER — APPOINTMENT (OUTPATIENT)
Dept: RADIOLOGY | Facility: MEDICAL CENTER | Age: 49
End: 2024-06-12
Attending: CHIROPRACTOR
Payer: COMMERCIAL

## 2024-06-26 ENCOUNTER — TELEPHONE (OUTPATIENT)
Dept: HEALTH INFORMATION MANAGEMENT | Facility: OTHER | Age: 49
End: 2024-06-26
Payer: COMMERCIAL

## 2024-07-09 ENCOUNTER — HOSPITAL ENCOUNTER (OUTPATIENT)
Dept: RADIOLOGY | Facility: MEDICAL CENTER | Age: 49
End: 2024-07-09
Attending: CHIROPRACTOR
Payer: COMMERCIAL

## 2024-07-09 DIAGNOSIS — E06.3 CHRONIC LYMPHOCYTIC THYROIDITIS: ICD-10-CM

## 2024-07-09 DIAGNOSIS — K82.8 ADHESION OF GALLBLADDER: ICD-10-CM

## 2024-07-09 PROCEDURE — 76536 US EXAM OF HEAD AND NECK: CPT

## 2024-08-08 ENCOUNTER — HOSPITAL ENCOUNTER (OUTPATIENT)
Dept: LAB | Facility: MEDICAL CENTER | Age: 49
End: 2024-08-08
Attending: CHIROPRACTOR
Payer: COMMERCIAL

## 2024-08-08 LAB
THYROPEROXIDASE AB SERPL-ACNC: <9 IU/ML (ref 0–9)
TSH SERPL-ACNC: 1.54 UIU/ML (ref 0.35–5.5)

## 2024-08-08 PROCEDURE — 82248 BILIRUBIN DIRECT: CPT

## 2024-08-08 PROCEDURE — 83090 ASSAY OF HOMOCYSTEINE: CPT

## 2024-08-08 PROCEDURE — 84075 ASSAY ALKALINE PHOSPHATASE: CPT

## 2024-08-08 PROCEDURE — 80069 RENAL FUNCTION PANEL: CPT

## 2024-08-08 PROCEDURE — 84155 ASSAY OF PROTEIN SERUM: CPT

## 2024-08-08 PROCEDURE — 36415 COLL VENOUS BLD VENIPUNCTURE: CPT

## 2024-08-08 PROCEDURE — 86376 MICROSOMAL ANTIBODY EACH: CPT

## 2024-08-08 PROCEDURE — 82247 BILIRUBIN TOTAL: CPT

## 2024-08-08 PROCEDURE — 84450 TRANSFERASE (AST) (SGOT): CPT

## 2024-08-08 PROCEDURE — 84460 ALANINE AMINO (ALT) (SGPT): CPT

## 2024-08-08 PROCEDURE — 84443 ASSAY THYROID STIM HORMONE: CPT

## 2024-08-09 LAB
ALBUMIN SERPL BCP-MCNC: 4.3 G/DL (ref 3.2–4.9)
ALP SERPL-CCNC: 57 U/L (ref 30–99)
ALT SERPL-CCNC: 15 U/L (ref 2–50)
AST SERPL-CCNC: 15 U/L (ref 12–45)
BILIRUB CONJ SERPL-MCNC: <0.2 MG/DL (ref 0.1–0.5)
BILIRUB INDIRECT SERPL-MCNC: NORMAL MG/DL (ref 0–1)
BILIRUB SERPL-MCNC: 0.8 MG/DL (ref 0.1–1.5)
BUN SERPL-MCNC: 15 MG/DL (ref 8–22)
CALCIUM ALBUM COR SERPL-MCNC: 9 MG/DL (ref 8.5–10.5)
CALCIUM SERPL-MCNC: 9.2 MG/DL (ref 8.5–10.5)
CHLORIDE SERPL-SCNC: 103 MMOL/L (ref 96–112)
CO2 SERPL-SCNC: 24 MMOL/L (ref 20–33)
CREAT SERPL-MCNC: 0.94 MG/DL (ref 0.5–1.4)
GFR SERPLBLD CREATININE-BSD FMLA CKD-EPI: 75 ML/MIN/1.73 M 2
GLUCOSE SERPL-MCNC: 82 MG/DL (ref 65–99)
HCYS SERPL-SCNC: 9.55 UMOL/L
PHOSPHATE SERPL-MCNC: 3.3 MG/DL (ref 2.5–4.5)
POTASSIUM SERPL-SCNC: 4.1 MMOL/L (ref 3.6–5.5)
PROT SERPL-MCNC: 7.4 G/DL (ref 6–8.2)
SODIUM SERPL-SCNC: 138 MMOL/L (ref 135–145)

## 2024-08-12 ENCOUNTER — APPOINTMENT (OUTPATIENT)
Dept: RADIOLOGY | Facility: MEDICAL CENTER | Age: 49
End: 2024-08-12
Attending: CHIROPRACTOR
Payer: COMMERCIAL

## 2024-10-08 ENCOUNTER — HOSPITAL ENCOUNTER (OUTPATIENT)
Dept: LAB | Facility: MEDICAL CENTER | Age: 49
End: 2024-10-08
Attending: CHIROPRACTOR
Payer: COMMERCIAL

## 2024-10-08 LAB
BASOPHILS # BLD AUTO: 0.3 % (ref 0–1.8)
BASOPHILS # BLD: 0.02 K/UL (ref 0–0.12)
EOSINOPHIL # BLD AUTO: 0.02 K/UL (ref 0–0.51)
EOSINOPHIL NFR BLD: 0.3 % (ref 0–6.9)
ERYTHROCYTE [DISTWIDTH] IN BLOOD BY AUTOMATED COUNT: 40.2 FL (ref 35.9–50)
ERYTHROCYTE [SEDIMENTATION RATE] IN BLOOD BY WESTERGREN METHOD: 6 MM/HOUR (ref 0–25)
HCT VFR BLD AUTO: 38.8 % (ref 37–47)
HGB BLD-MCNC: 12.8 G/DL (ref 12–16)
IMM GRANULOCYTES # BLD AUTO: 0.02 K/UL (ref 0–0.11)
IMM GRANULOCYTES NFR BLD AUTO: 0.3 % (ref 0–0.9)
LYMPHOCYTES # BLD AUTO: 1.56 K/UL (ref 1–4.8)
LYMPHOCYTES NFR BLD: 26.5 % (ref 22–41)
MCH RBC QN AUTO: 28.8 PG (ref 27–33)
MCHC RBC AUTO-ENTMCNC: 33 G/DL (ref 32.2–35.5)
MCV RBC AUTO: 87.2 FL (ref 81.4–97.8)
MONOCYTES # BLD AUTO: 0.35 K/UL (ref 0–0.85)
MONOCYTES NFR BLD AUTO: 5.9 % (ref 0–13.4)
NEUTROPHILS # BLD AUTO: 3.92 K/UL (ref 1.82–7.42)
NEUTROPHILS NFR BLD: 66.7 % (ref 44–72)
NRBC # BLD AUTO: 0 K/UL
NRBC BLD-RTO: 0 /100 WBC (ref 0–0.2)
PLATELET # BLD AUTO: 246 K/UL (ref 164–446)
PMV BLD AUTO: 10.1 FL (ref 9–12.9)
RBC # BLD AUTO: 4.45 M/UL (ref 4.2–5.4)
WBC # BLD AUTO: 5.9 K/UL (ref 4.8–10.8)

## 2024-10-08 PROCEDURE — 86038 ANTINUCLEAR ANTIBODIES: CPT

## 2024-10-08 PROCEDURE — 84481 FREE ASSAY (FT-3): CPT

## 2024-10-08 PROCEDURE — 84443 ASSAY THYROID STIM HORMONE: CPT

## 2024-10-08 PROCEDURE — 84450 TRANSFERASE (AST) (SGOT): CPT

## 2024-10-08 PROCEDURE — 85025 COMPLETE CBC W/AUTO DIFF WBC: CPT

## 2024-10-08 PROCEDURE — 83520 IMMUNOASSAY QUANT NOS NONAB: CPT

## 2024-10-08 PROCEDURE — 82247 BILIRUBIN TOTAL: CPT

## 2024-10-08 PROCEDURE — 85652 RBC SED RATE AUTOMATED: CPT

## 2024-10-08 PROCEDURE — 83088 ASSAY OF HISTAMINE: CPT

## 2024-10-08 PROCEDURE — 82306 VITAMIN D 25 HYDROXY: CPT

## 2024-10-08 PROCEDURE — 84439 ASSAY OF FREE THYROXINE: CPT

## 2024-10-08 PROCEDURE — 86376 MICROSOMAL ANTIBODY EACH: CPT

## 2024-10-08 PROCEDURE — 80069 RENAL FUNCTION PANEL: CPT

## 2024-10-08 PROCEDURE — 83090 ASSAY OF HOMOCYSTEINE: CPT

## 2024-10-08 PROCEDURE — 82728 ASSAY OF FERRITIN: CPT

## 2024-10-08 PROCEDURE — 86800 THYROGLOBULIN ANTIBODY: CPT

## 2024-10-08 PROCEDURE — 84155 ASSAY OF PROTEIN SERUM: CPT

## 2024-10-08 PROCEDURE — 83516 IMMUNOASSAY NONANTIBODY: CPT

## 2024-10-08 PROCEDURE — 84460 ALANINE AMINO (ALT) (SGPT): CPT

## 2024-10-08 PROCEDURE — 84075 ASSAY ALKALINE PHOSPHATASE: CPT

## 2024-10-08 PROCEDURE — 82977 ASSAY OF GGT: CPT

## 2024-10-08 PROCEDURE — 82248 BILIRUBIN DIRECT: CPT

## 2024-10-08 PROCEDURE — 36415 COLL VENOUS BLD VENIPUNCTURE: CPT

## 2024-10-09 LAB
25(OH)D3 SERPL-MCNC: 55 NG/ML (ref 30–100)
ALBUMIN SERPL BCP-MCNC: 4.4 G/DL (ref 3.2–4.9)
ALP SERPL-CCNC: 52 U/L (ref 30–99)
ALT SERPL-CCNC: 17 U/L (ref 2–50)
AST SERPL-CCNC: 18 U/L (ref 12–45)
BILIRUB CONJ SERPL-MCNC: <0.2 MG/DL (ref 0.1–0.5)
BILIRUB INDIRECT SERPL-MCNC: NORMAL MG/DL (ref 0–1)
BILIRUB SERPL-MCNC: 0.6 MG/DL (ref 0.1–1.5)
BUN SERPL-MCNC: 11 MG/DL (ref 8–22)
CALCIUM ALBUM COR SERPL-MCNC: 9 MG/DL (ref 8.5–10.5)
CALCIUM SERPL-MCNC: 9.3 MG/DL (ref 8.5–10.5)
CHLORIDE SERPL-SCNC: 101 MMOL/L (ref 96–112)
CO2 SERPL-SCNC: 22 MMOL/L (ref 20–33)
CREAT SERPL-MCNC: 0.87 MG/DL (ref 0.5–1.4)
FERRITIN SERPL-MCNC: 31.1 NG/ML (ref 10–291)
GFR SERPLBLD CREATININE-BSD FMLA CKD-EPI: 82 ML/MIN/1.73 M 2
GGT SERPL-CCNC: 8 U/L (ref 7–34)
GLUCOSE SERPL-MCNC: 122 MG/DL (ref 65–99)
HCYS SERPL-SCNC: 7.9 UMOL/L
PHOSPHATE SERPL-MCNC: 2.8 MG/DL (ref 2.5–4.5)
POTASSIUM SERPL-SCNC: 3.8 MMOL/L (ref 3.6–5.5)
PROT SERPL-MCNC: 6.9 G/DL (ref 6–8.2)
SODIUM SERPL-SCNC: 135 MMOL/L (ref 135–145)
T3FREE SERPL-MCNC: 2.19 PG/ML (ref 2–4.4)
T4 FREE SERPL-MCNC: 1.27 NG/DL (ref 0.93–1.7)
THYROPEROXIDASE AB SERPL-ACNC: 9 IU/ML (ref 0–9)
TSH SERPL-ACNC: 1.49 UIU/ML (ref 0.35–5.5)

## 2024-10-10 LAB
NUCLEAR IGG SER QL IA: NORMAL
PCA IGG SER-ACNC: 1.6 UNITS (ref 0–24.9)
THYROGLOB AB SERPL-ACNC: <0.9 IU/ML (ref 0–4)

## 2024-10-11 LAB — TRYPTASE SERPL-MCNC: 4.8 UG/L

## 2024-10-14 LAB — HISTAMINE SERPL-SCNC: <8 NMOL/L (ref 0–8)

## 2024-10-16 ENCOUNTER — OFFICE VISIT (OUTPATIENT)
Dept: MEDICAL GROUP | Facility: PHYSICIAN GROUP | Age: 49
End: 2024-10-16
Payer: COMMERCIAL

## 2024-10-16 VITALS
HEIGHT: 68 IN | DIASTOLIC BLOOD PRESSURE: 60 MMHG | OXYGEN SATURATION: 97 % | BODY MASS INDEX: 20.58 KG/M2 | HEART RATE: 85 BPM | WEIGHT: 135.8 LBS | RESPIRATION RATE: 20 BRPM | SYSTOLIC BLOOD PRESSURE: 110 MMHG | TEMPERATURE: 99.8 F

## 2024-10-16 DIAGNOSIS — D48.9 NEOPLASM OF UNCERTAIN BEHAVIOR: ICD-10-CM

## 2024-10-16 DIAGNOSIS — G90.A POTS (POSTURAL ORTHOSTATIC TACHYCARDIA SYNDROME): ICD-10-CM

## 2024-10-16 DIAGNOSIS — R42 DIZZINESS: ICD-10-CM

## 2024-10-16 DIAGNOSIS — E06.3 HASHIMOTO'S DISEASE: ICD-10-CM

## 2024-10-16 DIAGNOSIS — R00.2 PALPITATIONS: ICD-10-CM

## 2024-10-16 DIAGNOSIS — F41.9 ANXIETY: ICD-10-CM

## 2024-10-16 DIAGNOSIS — E04.2 MULTIPLE THYROID NODULES: ICD-10-CM

## 2024-10-16 PROCEDURE — 99214 OFFICE O/P EST MOD 30 MIN: CPT | Performed by: FAMILY MEDICINE

## 2024-10-16 PROCEDURE — 3074F SYST BP LT 130 MM HG: CPT | Performed by: FAMILY MEDICINE

## 2024-10-16 PROCEDURE — 3078F DIAST BP <80 MM HG: CPT | Performed by: FAMILY MEDICINE

## 2024-10-16 RX ORDER — HYDROXYZINE HYDROCHLORIDE 25 MG/1
25 TABLET, FILM COATED ORAL 2 TIMES DAILY PRN
Qty: 180 TABLET | Refills: 0 | Status: SHIPPED | OUTPATIENT
Start: 2024-10-16

## 2024-10-16 ASSESSMENT — FIBROSIS 4 INDEX: FIB4 SCORE: 0.83

## 2024-11-07 ENCOUNTER — PATIENT MESSAGE (OUTPATIENT)
Dept: MEDICAL GROUP | Facility: PHYSICIAN GROUP | Age: 49
End: 2024-11-07
Payer: COMMERCIAL

## 2024-11-07 DIAGNOSIS — R00.2 PALPITATIONS: ICD-10-CM

## 2024-11-07 DIAGNOSIS — G90.A POTS (POSTURAL ORTHOSTATIC TACHYCARDIA SYNDROME): ICD-10-CM

## 2024-11-07 RX ORDER — PROPRANOLOL HYDROCHLORIDE 10 MG/1
10 TABLET ORAL 3 TIMES DAILY
Qty: 270 TABLET | Refills: 3 | Status: SHIPPED | OUTPATIENT
Start: 2024-11-07

## 2024-11-11 ENCOUNTER — APPOINTMENT (OUTPATIENT)
Dept: RADIOLOGY | Facility: MEDICAL CENTER | Age: 49
End: 2024-11-11
Attending: EMERGENCY MEDICINE
Payer: COMMERCIAL

## 2024-11-11 ENCOUNTER — HOSPITAL ENCOUNTER (OUTPATIENT)
Facility: MEDICAL CENTER | Age: 49
End: 2024-11-11
Attending: EMERGENCY MEDICINE | Admitting: STUDENT IN AN ORGANIZED HEALTH CARE EDUCATION/TRAINING PROGRAM
Payer: COMMERCIAL

## 2024-11-11 ENCOUNTER — PHARMACY VISIT (OUTPATIENT)
Dept: PHARMACY | Facility: MEDICAL CENTER | Age: 49
End: 2024-11-11
Payer: COMMERCIAL

## 2024-11-11 ENCOUNTER — APPOINTMENT (OUTPATIENT)
Dept: MEDICAL GROUP | Facility: PHYSICIAN GROUP | Age: 49
End: 2024-11-11
Payer: COMMERCIAL

## 2024-11-11 ENCOUNTER — APPOINTMENT (OUTPATIENT)
Dept: RADIOLOGY | Facility: MEDICAL CENTER | Age: 49
End: 2024-11-11
Attending: STUDENT IN AN ORGANIZED HEALTH CARE EDUCATION/TRAINING PROGRAM
Payer: COMMERCIAL

## 2024-11-11 VITALS
SYSTOLIC BLOOD PRESSURE: 93 MMHG | RESPIRATION RATE: 14 BRPM | DIASTOLIC BLOOD PRESSURE: 55 MMHG | BODY MASS INDEX: 20.38 KG/M2 | HEART RATE: 75 BPM | HEIGHT: 68 IN | WEIGHT: 134.48 LBS | OXYGEN SATURATION: 94 % | TEMPERATURE: 98.6 F

## 2024-11-11 DIAGNOSIS — R42 DIZZINESS: ICD-10-CM

## 2024-11-11 DIAGNOSIS — R29.90 STROKE-LIKE SYMPTOM: ICD-10-CM

## 2024-11-11 DIAGNOSIS — G43.009 ATYPICAL MIGRAINE: ICD-10-CM

## 2024-11-11 DIAGNOSIS — F41.9 ANXIETY: ICD-10-CM

## 2024-11-11 PROBLEM — G45.9 TIA (TRANSIENT ISCHEMIC ATTACK): Status: ACTIVE | Noted: 2024-11-11

## 2024-11-11 LAB
ABO + RH BLD: NORMAL
ABO GROUP BLD: NORMAL
ALBUMIN SERPL BCP-MCNC: 4.9 G/DL (ref 3.2–4.9)
ALBUMIN/GLOB SERPL: 1.5 G/DL
ALP SERPL-CCNC: 62 U/L (ref 30–99)
ALT SERPL-CCNC: 19 U/L (ref 2–50)
ANION GAP SERPL CALC-SCNC: 13 MMOL/L (ref 7–16)
APTT PPP: 25.6 SEC (ref 24.7–36)
AST SERPL-CCNC: 16 U/L (ref 12–45)
BASOPHILS # BLD AUTO: 0.5 % (ref 0–1.8)
BASOPHILS # BLD: 0.03 K/UL (ref 0–0.12)
BILIRUB SERPL-MCNC: 1.4 MG/DL (ref 0.1–1.5)
BLD GP AB SCN SERPL QL: NORMAL
BUN SERPL-MCNC: 11 MG/DL (ref 8–22)
CALCIUM ALBUM COR SERPL-MCNC: 9.4 MG/DL (ref 8.5–10.5)
CALCIUM SERPL-MCNC: 10.1 MG/DL (ref 8.5–10.5)
CHLORIDE SERPL-SCNC: 102 MMOL/L (ref 96–112)
CO2 SERPL-SCNC: 22 MMOL/L (ref 20–33)
CREAT SERPL-MCNC: 0.86 MG/DL (ref 0.5–1.4)
EKG IMPRESSION: NORMAL
EOSINOPHIL # BLD AUTO: 0.01 K/UL (ref 0–0.51)
EOSINOPHIL NFR BLD: 0.2 % (ref 0–6.9)
ERYTHROCYTE [DISTWIDTH] IN BLOOD BY AUTOMATED COUNT: 39.1 FL (ref 35.9–50)
GFR SERPLBLD CREATININE-BSD FMLA CKD-EPI: 83 ML/MIN/1.73 M 2
GLOBULIN SER CALC-MCNC: 3.2 G/DL (ref 1.9–3.5)
GLUCOSE SERPL-MCNC: 104 MG/DL (ref 65–99)
HCT VFR BLD AUTO: 44.6 % (ref 37–47)
HGB BLD-MCNC: 16.4 G/DL (ref 12–16)
IMM GRANULOCYTES # BLD AUTO: 0.02 K/UL (ref 0–0.11)
IMM GRANULOCYTES NFR BLD AUTO: 0.3 % (ref 0–0.9)
INR PPP: 0.99 (ref 0.87–1.13)
LYMPHOCYTES # BLD AUTO: 1.2 K/UL (ref 1–4.8)
LYMPHOCYTES NFR BLD: 18.9 % (ref 22–41)
MAGNESIUM SERPL-MCNC: 1.9 MG/DL (ref 1.5–2.5)
MCH RBC QN AUTO: 31.3 PG (ref 27–33)
MCHC RBC AUTO-ENTMCNC: 36.8 G/DL (ref 32.2–35.5)
MCV RBC AUTO: 85.1 FL (ref 81.4–97.8)
MONOCYTES # BLD AUTO: 0.29 K/UL (ref 0–0.85)
MONOCYTES NFR BLD AUTO: 4.6 % (ref 0–13.4)
NEUTROPHILS # BLD AUTO: 4.81 K/UL (ref 1.82–7.42)
NEUTROPHILS NFR BLD: 75.5 % (ref 44–72)
NRBC # BLD AUTO: 0 K/UL
NRBC BLD-RTO: 0 /100 WBC (ref 0–0.2)
PHOSPHATE SERPL-MCNC: 2.8 MG/DL (ref 2.5–4.5)
PLATELET # BLD AUTO: 331 K/UL (ref 164–446)
PMV BLD AUTO: 9.7 FL (ref 9–12.9)
POTASSIUM SERPL-SCNC: 4 MMOL/L (ref 3.6–5.5)
PROT SERPL-MCNC: 8.1 G/DL (ref 6–8.2)
PROTHROMBIN TIME: 13.1 SEC (ref 12–14.6)
RBC # BLD AUTO: 5.24 M/UL (ref 4.2–5.4)
RH BLD: NORMAL
SODIUM SERPL-SCNC: 137 MMOL/L (ref 135–145)
TROPONIN T SERPL-MCNC: <6 NG/L (ref 6–19)
WBC # BLD AUTO: 6.4 K/UL (ref 4.8–10.8)

## 2024-11-11 PROCEDURE — 86901 BLOOD TYPING SEROLOGIC RH(D): CPT

## 2024-11-11 PROCEDURE — G0378 HOSPITAL OBSERVATION PER HR: HCPCS

## 2024-11-11 PROCEDURE — 86850 RBC ANTIBODY SCREEN: CPT

## 2024-11-11 PROCEDURE — 70498 CT ANGIOGRAPHY NECK: CPT

## 2024-11-11 PROCEDURE — 700117 HCHG RX CONTRAST REV CODE 255: Performed by: EMERGENCY MEDICINE

## 2024-11-11 PROCEDURE — RXMED WILLOW AMBULATORY MEDICATION CHARGE: Performed by: STUDENT IN AN ORGANIZED HEALTH CARE EDUCATION/TRAINING PROGRAM

## 2024-11-11 PROCEDURE — 0042T CT-CEREBRAL PERFUSION ANALYSIS: CPT

## 2024-11-11 PROCEDURE — 85610 PROTHROMBIN TIME: CPT

## 2024-11-11 PROCEDURE — 96375 TX/PRO/DX INJ NEW DRUG ADDON: CPT

## 2024-11-11 PROCEDURE — 85730 THROMBOPLASTIN TIME PARTIAL: CPT

## 2024-11-11 PROCEDURE — 700111 HCHG RX REV CODE 636 W/ 250 OVERRIDE (IP): Performed by: EMERGENCY MEDICINE

## 2024-11-11 PROCEDURE — 700111 HCHG RX REV CODE 636 W/ 250 OVERRIDE (IP): Performed by: NURSE PRACTITIONER

## 2024-11-11 PROCEDURE — 71045 X-RAY EXAM CHEST 1 VIEW: CPT

## 2024-11-11 PROCEDURE — 80053 COMPREHEN METABOLIC PANEL: CPT

## 2024-11-11 PROCEDURE — 700102 HCHG RX REV CODE 250 W/ 637 OVERRIDE(OP): Performed by: STUDENT IN AN ORGANIZED HEALTH CARE EDUCATION/TRAINING PROGRAM

## 2024-11-11 PROCEDURE — 96374 THER/PROPH/DIAG INJ IV PUSH: CPT

## 2024-11-11 PROCEDURE — 86900 BLOOD TYPING SEROLOGIC ABO: CPT | Mod: 91

## 2024-11-11 PROCEDURE — 83735 ASSAY OF MAGNESIUM: CPT

## 2024-11-11 PROCEDURE — 84100 ASSAY OF PHOSPHORUS: CPT

## 2024-11-11 PROCEDURE — 84484 ASSAY OF TROPONIN QUANT: CPT

## 2024-11-11 PROCEDURE — 93005 ELECTROCARDIOGRAM TRACING: CPT | Performed by: EMERGENCY MEDICINE

## 2024-11-11 PROCEDURE — 70496 CT ANGIOGRAPHY HEAD: CPT

## 2024-11-11 PROCEDURE — 36415 COLL VENOUS BLD VENIPUNCTURE: CPT

## 2024-11-11 PROCEDURE — 85025 COMPLETE CBC W/AUTO DIFF WBC: CPT

## 2024-11-11 PROCEDURE — 99285 EMERGENCY DEPT VISIT HI MDM: CPT

## 2024-11-11 PROCEDURE — A9270 NON-COVERED ITEM OR SERVICE: HCPCS | Performed by: STUDENT IN AN ORGANIZED HEALTH CARE EDUCATION/TRAINING PROGRAM

## 2024-11-11 PROCEDURE — 70551 MRI BRAIN STEM W/O DYE: CPT

## 2024-11-11 RX ORDER — ONDANSETRON 2 MG/ML
4 INJECTION INTRAMUSCULAR; INTRAVENOUS EVERY 4 HOURS PRN
Status: DISCONTINUED | OUTPATIENT
Start: 2024-11-11 | End: 2024-11-11 | Stop reason: HOSPADM

## 2024-11-11 RX ORDER — ENOXAPARIN SODIUM 100 MG/ML
40 INJECTION SUBCUTANEOUS DAILY
Status: DISCONTINUED | OUTPATIENT
Start: 2024-11-12 | End: 2024-11-11 | Stop reason: HOSPADM

## 2024-11-11 RX ORDER — PROPRANOLOL HYDROCHLORIDE 10 MG/1
10 TABLET ORAL 3 TIMES DAILY
Status: DISCONTINUED | OUTPATIENT
Start: 2024-11-11 | End: 2024-11-11 | Stop reason: HOSPADM

## 2024-11-11 RX ORDER — PROMETHAZINE HYDROCHLORIDE 25 MG/1
12.5-25 SUPPOSITORY RECTAL EVERY 4 HOURS PRN
Status: DISCONTINUED | OUTPATIENT
Start: 2024-11-11 | End: 2024-11-11 | Stop reason: HOSPADM

## 2024-11-11 RX ORDER — MECLIZINE HYDROCHLORIDE 25 MG/1
25 TABLET ORAL 3 TIMES DAILY PRN
COMMUNITY
Start: 2024-11-02

## 2024-11-11 RX ORDER — AMOXICILLIN 250 MG
2 CAPSULE ORAL EVERY EVENING
Status: DISCONTINUED | OUTPATIENT
Start: 2024-11-11 | End: 2024-11-11 | Stop reason: HOSPADM

## 2024-11-11 RX ORDER — PROMETHAZINE HYDROCHLORIDE 25 MG/1
12.5-25 TABLET ORAL EVERY 4 HOURS PRN
Status: DISCONTINUED | OUTPATIENT
Start: 2024-11-11 | End: 2024-11-11 | Stop reason: HOSPADM

## 2024-11-11 RX ORDER — PROCHLORPERAZINE EDISYLATE 5 MG/ML
5-10 INJECTION INTRAMUSCULAR; INTRAVENOUS EVERY 4 HOURS PRN
Status: DISCONTINUED | OUTPATIENT
Start: 2024-11-11 | End: 2024-11-11 | Stop reason: HOSPADM

## 2024-11-11 RX ORDER — ACETAMINOPHEN 325 MG/1
650 TABLET ORAL EVERY 6 HOURS PRN
Status: DISCONTINUED | OUTPATIENT
Start: 2024-11-11 | End: 2024-11-11 | Stop reason: HOSPADM

## 2024-11-11 RX ORDER — ALPRAZOLAM 0.5 MG
0.5 TABLET ORAL 2 TIMES DAILY PRN
Qty: 30 TABLET | Refills: 0 | Status: SHIPPED | OUTPATIENT
Start: 2024-11-11 | End: 2024-12-01

## 2024-11-11 RX ORDER — MECLIZINE HYDROCHLORIDE 25 MG/1
25 TABLET ORAL 3 TIMES DAILY PRN
Status: DISCONTINUED | OUTPATIENT
Start: 2024-11-11 | End: 2024-11-11 | Stop reason: HOSPADM

## 2024-11-11 RX ORDER — LORAZEPAM 2 MG/ML
1 INJECTION INTRAMUSCULAR ONCE
Status: COMPLETED | OUTPATIENT
Start: 2024-11-11 | End: 2024-11-11

## 2024-11-11 RX ORDER — ONDANSETRON 4 MG/1
4 TABLET, ORALLY DISINTEGRATING ORAL EVERY 4 HOURS PRN
Status: DISCONTINUED | OUTPATIENT
Start: 2024-11-11 | End: 2024-11-11 | Stop reason: HOSPADM

## 2024-11-11 RX ORDER — SUMATRIPTAN 50 MG/1
50 TABLET, FILM COATED ORAL
Qty: 10 TABLET | Refills: 3 | Status: SHIPPED | OUTPATIENT
Start: 2024-11-11

## 2024-11-11 RX ORDER — HYDROXYZINE HYDROCHLORIDE 25 MG/1
25 TABLET, FILM COATED ORAL 2 TIMES DAILY PRN
Status: DISCONTINUED | OUTPATIENT
Start: 2024-11-11 | End: 2024-11-11 | Stop reason: HOSPADM

## 2024-11-11 RX ORDER — POLYETHYLENE GLYCOL 3350 17 G/17G
1 POWDER, FOR SOLUTION ORAL
Status: DISCONTINUED | OUTPATIENT
Start: 2024-11-11 | End: 2024-11-11 | Stop reason: HOSPADM

## 2024-11-11 RX ORDER — METOCLOPRAMIDE HYDROCHLORIDE 5 MG/ML
10 INJECTION INTRAMUSCULAR; INTRAVENOUS EVERY 6 HOURS
Status: DISCONTINUED | OUTPATIENT
Start: 2024-11-11 | End: 2024-11-11 | Stop reason: HOSPADM

## 2024-11-11 RX ORDER — ACETAMINOPHEN 500 MG
1000 TABLET ORAL EVERY 8 HOURS PRN
COMMUNITY

## 2024-11-11 RX ORDER — LABETALOL HYDROCHLORIDE 5 MG/ML
10 INJECTION, SOLUTION INTRAVENOUS EVERY 4 HOURS PRN
Status: DISCONTINUED | OUTPATIENT
Start: 2024-11-11 | End: 2024-11-11 | Stop reason: HOSPADM

## 2024-11-11 RX ADMIN — PROPRANOLOL HYDROCHLORIDE 10 MG: 10 TABLET ORAL at 13:36

## 2024-11-11 RX ADMIN — LORAZEPAM 1 MG: 2 INJECTION INTRAMUSCULAR; INTRAVENOUS at 10:14

## 2024-11-11 RX ADMIN — METOCLOPRAMIDE 10 MG: 5 INJECTION, SOLUTION INTRAMUSCULAR; INTRAVENOUS at 13:36

## 2024-11-11 RX ADMIN — IOHEXOL 80 ML: 350 INJECTION, SOLUTION INTRAVENOUS at 09:12

## 2024-11-11 RX ADMIN — IOHEXOL 40 ML: 350 INJECTION, SOLUTION INTRAVENOUS at 09:11

## 2024-11-11 SDOH — ECONOMIC STABILITY: TRANSPORTATION INSECURITY
IN THE PAST 12 MONTHS, HAS THE LACK OF TRANSPORTATION KEPT YOU FROM MEDICAL APPOINTMENTS OR FROM GETTING MEDICATIONS?: PATIENT DECLINED

## 2024-11-11 SDOH — ECONOMIC STABILITY: TRANSPORTATION INSECURITY
IN THE PAST 12 MONTHS, HAS LACK OF RELIABLE TRANSPORTATION KEPT YOU FROM MEDICAL APPOINTMENTS, MEETINGS, WORK OR FROM GETTING THINGS NEEDED FOR DAILY LIVING?: PATIENT DECLINED

## 2024-11-11 ASSESSMENT — FIBROSIS 4 INDEX
FIB4 SCORE: 0.52
FIB4 SCORE: 0.62

## 2024-11-11 ASSESSMENT — PATIENT HEALTH QUESTIONNAIRE - PHQ9
6. FEELING BAD ABOUT YOURSELF - OR THAT YOU ARE A FAILURE OR HAVE LET YOURSELF OR YOUR FAMILY DOWN: NOT AL ALL
9. THOUGHTS THAT YOU WOULD BE BETTER OFF DEAD, OR OF HURTING YOURSELF: NOT AT ALL
7. TROUBLE CONCENTRATING ON THINGS, SUCH AS READING THE NEWSPAPER OR WATCHING TELEVISION: SEVERAL DAYS
3. TROUBLE FALLING OR STAYING ASLEEP OR SLEEPING TOO MUCH: SEVERAL DAYS
8. MOVING OR SPEAKING SO SLOWLY THAT OTHER PEOPLE COULD HAVE NOTICED. OR THE OPPOSITE, BEING SO FIGETY OR RESTLESS THAT YOU HAVE BEEN MOVING AROUND A LOT MORE THAN USUAL: NOT AT ALL
SUM OF ALL RESPONSES TO PHQ9 QUESTIONS 1 AND 2: 1
4. FEELING TIRED OR HAVING LITTLE ENERGY: NOT AT ALL
5. POOR APPETITE OR OVEREATING: NOT AT ALL
1. LITTLE INTEREST OR PLEASURE IN DOING THINGS: SEVERAL DAYS

## 2024-11-11 ASSESSMENT — LIFESTYLE VARIABLES
HAVE YOU EVER FELT YOU SHOULD CUT DOWN ON YOUR DRINKING: NO
TOTAL SCORE: 0
ALCOHOL_USE: NO
TOTAL SCORE: 0
ON A TYPICAL DAY WHEN YOU DRINK ALCOHOL HOW MANY DRINKS DO YOU HAVE: 0
HAVE PEOPLE ANNOYED YOU BY CRITICIZING YOUR DRINKING: NO
AVERAGE NUMBER OF DAYS PER WEEK YOU HAVE A DRINK CONTAINING ALCOHOL: 0
EVER HAD A DRINK FIRST THING IN THE MORNING TO STEADY YOUR NERVES TO GET RID OF A HANGOVER: NO
CONSUMPTION TOTAL: NEGATIVE
EVER FELT BAD OR GUILTY ABOUT YOUR DRINKING: NO
TOTAL SCORE: 0
HOW MANY TIMES IN THE PAST YEAR HAVE YOU HAD 5 OR MORE DRINKS IN A DAY: 0

## 2024-11-11 ASSESSMENT — SOCIAL DETERMINANTS OF HEALTH (SDOH)
WITHIN THE PAST 12 MONTHS, YOU WORRIED THAT YOUR FOOD WOULD RUN OUT BEFORE YOU GOT THE MONEY TO BUY MORE: PATIENT DECLINED
WITHIN THE PAST 12 MONTHS, THE FOOD YOU BOUGHT JUST DIDN'T LAST AND YOU DIDN'T HAVE MONEY TO GET MORE: PATIENT DECLINED
WITHIN THE LAST YEAR, HAVE TO BEEN RAPED OR FORCED TO HAVE ANY KIND OF SEXUAL ACTIVITY BY YOUR PARTNER OR EX-PARTNER?: NO
IN THE PAST 12 MONTHS, HAS THE ELECTRIC, GAS, OIL, OR WATER COMPANY THREATENED TO SHUT OFF SERVICE IN YOUR HOME?: PATIENT DECLINED
WITHIN THE LAST YEAR, HAVE YOU BEEN AFRAID OF YOUR PARTNER OR EX-PARTNER?: NO
WITHIN THE LAST YEAR, HAVE YOU BEEN HUMILIATED OR EMOTIONALLY ABUSED IN OTHER WAYS BY YOUR PARTNER OR EX-PARTNER?: NO
WITHIN THE LAST YEAR, HAVE YOU BEEN KICKED, HIT, SLAPPED, OR OTHERWISE PHYSICALLY HURT BY YOUR PARTNER OR EX-PARTNER?: NO

## 2024-11-11 NOTE — ED PROVIDER NOTES
ED Provider Note    CHIEF COMPLAINT  Chief Complaint   Patient presents with    Dizziness    Tingling       EXTERNAL RECORDS REVIEWED  Outpatient Notes   [facility], population [facility]    HPI/ROS  LIMITATION TO HISTORY   None  OUTSIDE HISTORIAN(S):  None    Maribel Romo is a 48 y.o. female who presents here for evaluation of left-sided facial numbness and left upper and lower extremity numbness.  Patient states this is been intermittently happening over the last 3 weeks, but has not had any falls or trauma.  She has no fever chills vomiting, no chest pain shortness of breath.  Patient is very anxious regarding the symptoms.    PAST MEDICAL HISTORY   has a past medical history of Anesthesia, Anxiety, Depression, Hashimoto's disease, POTS (postural orthostatic tachycardia syndrome), and Thyroid nodule.    SURGICAL HISTORY   has a past surgical history that includes primary c section; dilation and curettage; and hemorrhoidectomy (2019).    FAMILY HISTORY  Family History   Problem Relation Age of Onset    Hyperlipidemia Mother     No Known Problems Father     Thyroid Sister 38        hashimoto thyroiditis    Breast Cancer Paternal Aunt        SOCIAL HISTORY  Social History     Tobacco Use    Smoking status: Former     Current packs/day: 0.00     Types: Cigarettes     Start date: 2000     Quit date: 2004     Years since quittin.2    Smokeless tobacco: Never   Vaping Use    Vaping status: Never Used   Substance and Sexual Activity    Alcohol use: Not Currently    Drug use: No    Sexual activity: Yes     Partners: Male     Birth control/protection: Condom       CURRENT MEDICATIONS  Home Medications       Reviewed by Omi Amanda (Pharmacy Tech) on 24 at 1040  Med List Status: Complete     Medication Last Dose Status   acetaminophen (TYLENOL) 500 MG Tab 11/10/2024 Active   hydrOXYzine HCl (ATARAX) 25 MG Tab 2024 Active   ibuprofen (MOTRIN) 200 MG Tab 2024  "Active   meclizine (ANTIVERT) 25 MG Tab 11/8/2024 Active   propranolol (INDERAL) 10 MG Tab 11/10/2024 Active                  Audit from Redirected Encounters    **Home medications have not yet been reviewed for this encounter**         ALLERGIES  Allergies   Allergen Reactions    Caffeine Palpitations    Keflex Rash and Itching     ALL OVER BODY RASH, SCRATCHY THROAT    Dairy Food Allergy      Does not eat    Gluten Meal      Does not eat    Seasonal Itching    Soy Allergy      Does not eat       PHYSICAL EXAM  VITAL SIGNS: /71   Pulse 89   Temp 36.7 °C (98 °F) (Temporal)   Resp 14   Ht 1.727 m (5' 8\")   Wt 61 kg (134 lb 7.7 oz)   LMP 09/23/2024   SpO2 93%   BMI 20.45 kg/m²    Constitutional: Well developed, well nourished. No acute distress.  HEENT: Normocephalic, atraumatic. Posterior pharynx clear and moist.  Eyes:  EOMI. Normal sclera.  Neck: Supple, Full range of motion, nontender.  Chest/Pulmonary: clear to ausculation. Symmetrical expansion.   Cardio: Regular rate and rhythm with no murmur.   Abdomen: Soft, nontender. No peritoneal signs. No guarding. No palpable masses.  Back: No CVA tenderness, nontender midline, no step offs.  Musculoskeletal: No deformity, no edema, neurovascular intact.  Equal , dorsi plantarflex extension 5 out of 5.  Neuro: Clear speech, appropriate, cooperative, cranial nerves II-XII grossly intact.  Psych: Normal mood and affect      EKG/LABS  Results for orders placed or performed during the hospital encounter of 11/11/24   CBC WITH DIFFERENTIAL    Collection Time: 11/11/24  8:50 AM   Result Value Ref Range    WBC 6.4 4.8 - 10.8 K/uL    RBC 5.24 4.20 - 5.40 M/uL    Hemoglobin 16.4 (H) 12.0 - 16.0 g/dL    Hematocrit 44.6 37.0 - 47.0 %    MCV 85.1 81.4 - 97.8 fL    MCH 31.3 27.0 - 33.0 pg    MCHC 36.8 (H) 32.2 - 35.5 g/dL    RDW 39.1 35.9 - 50.0 fL    Platelet Count 331 164 - 446 K/uL    MPV 9.7 9.0 - 12.9 fL    Neutrophils-Polys 75.50 (H) 44.00 - 72.00 %    " Lymphocytes 18.90 (L) 22.00 - 41.00 %    Monocytes 4.60 0.00 - 13.40 %    Eosinophils 0.20 0.00 - 6.90 %    Basophils 0.50 0.00 - 1.80 %    Immature Granulocytes 0.30 0.00 - 0.90 %    Nucleated RBC 0.00 0.00 - 0.20 /100 WBC    Neutrophils (Absolute) 4.81 1.82 - 7.42 K/uL    Lymphs (Absolute) 1.20 1.00 - 4.80 K/uL    Monos (Absolute) 0.29 0.00 - 0.85 K/uL    Eos (Absolute) 0.01 0.00 - 0.51 K/uL    Baso (Absolute) 0.03 0.00 - 0.12 K/uL    Immature Granulocytes (abs) 0.02 0.00 - 0.11 K/uL    NRBC (Absolute) 0.00 K/uL   COMP METABOLIC PANEL    Collection Time: 11/11/24  8:50 AM   Result Value Ref Range    Sodium 137 135 - 145 mmol/L    Potassium 4.0 3.6 - 5.5 mmol/L    Chloride 102 96 - 112 mmol/L    Co2 22 20 - 33 mmol/L    Anion Gap 13.0 7.0 - 16.0    Glucose 104 (H) 65 - 99 mg/dL    Bun 11 8 - 22 mg/dL    Creatinine 0.86 0.50 - 1.40 mg/dL    Calcium 10.1 8.5 - 10.5 mg/dL    Correct Calcium 9.4 8.5 - 10.5 mg/dL    AST(SGOT) 16 12 - 45 U/L    ALT(SGPT) 19 2 - 50 U/L    Alkaline Phosphatase 62 30 - 99 U/L    Total Bilirubin 1.4 0.1 - 1.5 mg/dL    Albumin 4.9 3.2 - 4.9 g/dL    Total Protein 8.1 6.0 - 8.2 g/dL    Globulin 3.2 1.9 - 3.5 g/dL    A-G Ratio 1.5 g/dL   PROTHROMBIN TIME    Collection Time: 11/11/24  8:50 AM   Result Value Ref Range    PT 13.1 12.0 - 14.6 sec    INR 0.99 0.87 - 1.13   APTT    Collection Time: 11/11/24  8:50 AM   Result Value Ref Range    APTT 25.6 24.7 - 36.0 sec   COD (ADULT)    Collection Time: 11/11/24  8:50 AM   Result Value Ref Range    ABO Grouping Only A     Rh Grouping Only POS     Antibody Screen-Cod NEG    TROPONIN    Collection Time: 11/11/24  8:50 AM   Result Value Ref Range    Troponin T <6 6 - 19 ng/L   ESTIMATED GFR    Collection Time: 11/11/24  8:50 AM   Result Value Ref Range    GFR (CKD-EPI) 83 >60 mL/min/1.73 m 2   Magnesium    Collection Time: 11/11/24  8:50 AM   Result Value Ref Range    Magnesium 1.9 1.5 - 2.5 mg/dL   PHOSPHORUS    Collection Time: 11/11/24  8:50 AM    Result Value Ref Range    Phosphorus 2.8 2.5 - 4.5 mg/dL   EKG (NOW)    Collection Time: 24  9:28 AM   Result Value Ref Range    Report       [facility] Emergency Dept.    Test Date:  2024  Pt Name:    JENNY MARION         Department: ER  MRN:        [id number]                      Room:       [facility]  Gender:     Female                       Technician: [id number]  :        1975                   Requested By:MAYLIN MONTANA  Order #:    [id number]                    Reading MD:    Measurements  Intervals                                Axis  Rate:       79                           P:          70  NE:         139                          QRS:        68  QRSD:       86                           T:          51  QT:         368  QTc:        422    Interpretive Statements  Sinus rhythm  RSR' in V1 or V2, probably normal variant  No previous ECG available for comparison     ABO Rh Confirm    Collection Time: 24 12:20 PM   Result Value Ref Range    ABO Rh Confirm A POS      EKG; normal sinus rhythm at a rate of 79.  No ST elevation, no ST depression.  QTc is 422.  No comparison EKG available.    RADIOLOGY/PROCEDURES   I have independently interpreted the diagnostic imaging associated with this visit and am waiting the final reading from the radiologist.   My preliminary interpretation is as follows: Below    Radiologist interpretation:  DX-CHEST-PORTABLE (1 VIEW)   Final Result      No acute cardiac or pulmonary abnormalities are identified.      CT-CTA NECK WITH & W/O-POST PROCESSING   Final Result      There is no significant atherosclerosis, stenosis, occlusion, or aneurysm.      CT-CTA HEAD WITH & W/O-POST PROCESS   Final Result      CT angiogram of the [place] within normal limits.      CT-CEREBRAL PERFUSION ANALYSIS   Final Result      1. Cerebral blood flow less than 30% possibly representing completed infarct = 0 mL. Based on distribution of this finding,  "this is unlikely to represent artifact.      2. T Max more than 6 seconds possibly representing combination of completed infarct and ischemia = 0 mL. Based on the distribution of this finding, this is unlikely to represent artifact.      3. Mismatched volume possibly representing ischemic brain/penumbra= 0 mL      4.  Please note that this cerebral perfusion study and report is Quantitative and targets supratentorial (cerebral) perfusion for evaluation of large vessel territory acute ischemia/infarction. For example, lacunar infarcts, and brainstem/posterior fossa    ischemia/infarction are not evaluated on this study.  Data acquisition is subject to artifacts which can yield non-anatomically plausible perfusion maps which may be due to motion, bolus timing, signal to noise ratio, or other technical factors.    Perfusion map abnormalities which show non-anatomic distributions are likely artifact.   This study is not \"stand-alone\" and should only be utilized for diagnosis, management/treatment in correlation with CT, CTA, and/or MRI and clinical factors.         MR-BRAIN-W/O    (Results Pending)       COURSE & MEDICAL DECISION MAKING    ASSESSMENT, COURSE AND PLAN  Care Narrative: This is a 48-year-old female here for evaluation of strokelike symptoms.  Patient states that she has these sensations, intermittently over the last 3 weeks.  No acute finding noted on exam.  CT scans are negative.  She will be admitted to the hospital service for further evaluation and treatment.        DISPOSITION AND DISCUSSIONS  I have discussed management of the patient with the following physicians and ANA's: Hospitalist      FINAL DIAGNOSIS  1. Stroke-like symptom    2. Anxiety         Electronically signed by: Daquan Pradhan D.O., 11/11/2024 1:51 PM      "

## 2024-11-11 NOTE — ED NOTES
Med rec is complete per Patient at bedside.     Allergies reviewed.    Has patient had any outside antibiotics in the last 30 days? N    Any Anticoagulants (rivaroxaban, apixaban, edoxaban, dabigatran, warfarin, enoxaparin) taken in the last 14 days? N    Pharmacy/Pharmacies Pt utilizes : [facility] [phone]

## 2024-11-11 NOTE — PROGRESS NOTES
Pt brought from ED on arrival pt is awake,a&ox4,pt looks fatigue and withdrawn,no neuro deficits noticed,pt for MRI.

## 2024-11-11 NOTE — CARE PLAN
The patient is Watcher - Medium risk of patient condition declining or worsening         Progress made toward(s) clinical / shift goals:  MRI    Patient is not progressing towards the following goals:      Problem: Knowledge Deficit - Standard  Goal: Patient and family/care givers will demonstrate understanding of plan of care, disease process/condition, diagnostic tests and medications  Outcome: Progressing

## 2024-11-11 NOTE — ED TRIAGE NOTES
"Stroke assessment activated from triage and met by ERP at charge desk for evaluation of dizziness, upper extremity \"tingling\" and insomnia. Reports symptoms have been intermittent over past several weeks. She reports that she was unable to sleep last night and developed left arm tingling this morning. No facial droop or motor deficits. To CT by wheelchair with RN then to green 23. In gown, on monitor, call light within reach. Xray at bedside. Report to Donte RICHARDS.   " xPt endorses melena. CT scan with no evidence of contrast extravasation to suggest active GI bleed at this time. Stable 3 cm enhancing lesion at the hepatic dome with evidence of liver cirrhosis, circumferential wall thickening of the rectum and sigmoid colon concern for colitis, and pancreatic hypodensities measuring up to 2.0 cm. Considering liver disease, suspicion for ruptured esophogeal varcies. Recent EGD at [facility] reviewed, will discuss with GI     Plan:  - CBC qd  - GI planning for endoscopy & sigmoidoscopy deferred due to C.diff and stable HH  - PPI 40mg BID   - CTX discontinued

## 2024-11-12 NOTE — PROGRESS NOTES
4 Eyes Skin Assessment Completed by RN and EMT-Jael     Head WDL  Ears WDL  Nose WDL  Mouth WDL  Neck WDL  Breast/Chest WDL  Shoulder Blades WDL  Spine WDL  (R) Arm/Elbow/Hand  WNL  (L) Arm/Elbow-  Abdomen WDL  Groin WDL  Scrotum/Coccyx/Buttocks WDL  (R) Leg WDL  (L) Leg WDL  (R) Heel/Foot/Toe WDL  (L) Heel/Foot/Toe Devices In Place : Pulse OxInterventions In Place Pillows  Possible Skin Injury No   Pictures Uploaded Into Epic N/A  Wound Consult Placed N/A  RN Wound Prevention Protocol Ordered No

## 2024-11-12 NOTE — PROGRESS NOTES
Discharge instructions provided. Education complete. All questions answered. Pt to  [facility] due to delay. PIV removed. No home meds in pharmacy.

## 2024-11-14 ENCOUNTER — HOSPITAL ENCOUNTER (OUTPATIENT)
Dept: LAB | Facility: MEDICAL CENTER | Age: 49
End: 2024-11-14
Attending: CHIROPRACTOR
Payer: COMMERCIAL

## 2024-11-14 LAB — D DIMER PPP IA.FEU-MCNC: 0.34 UG/ML (FEU) (ref 0–0.5)

## 2024-11-14 PROCEDURE — 85730 THROMBOPLASTIN TIME PARTIAL: CPT

## 2024-11-14 PROCEDURE — 84443 ASSAY THYROID STIM HORMONE: CPT

## 2024-11-14 PROCEDURE — 36415 COLL VENOUS BLD VENIPUNCTURE: CPT

## 2024-11-14 PROCEDURE — 85520 HEPARIN ASSAY: CPT

## 2024-11-14 PROCEDURE — 83090 ASSAY OF HOMOCYSTEINE: CPT

## 2024-11-14 PROCEDURE — 83835 ASSAY OF METANEPHRINES: CPT

## 2024-11-14 PROCEDURE — 83516 IMMUNOASSAY NONANTIBODY: CPT

## 2024-11-14 PROCEDURE — 83520 IMMUNOASSAY QUANT NOS NONAB: CPT

## 2024-11-14 PROCEDURE — 86146 BETA-2 GLYCOPROTEIN ANTIBODY: CPT

## 2024-11-14 PROCEDURE — 85610 PROTHROMBIN TIME: CPT

## 2024-11-14 PROCEDURE — 86316 IMMUNOASSAY TUMOR OTHER: CPT

## 2024-11-14 PROCEDURE — 82728 ASSAY OF FERRITIN: CPT

## 2024-11-14 PROCEDURE — 86141 C-REACTIVE PROTEIN HS: CPT

## 2024-11-14 PROCEDURE — 86376 MICROSOMAL ANTIBODY EACH: CPT

## 2024-11-14 PROCEDURE — 85379 FIBRIN DEGRADATION QUANT: CPT

## 2024-11-14 PROCEDURE — 85613 RUSSELL VIPER VENOM DILUTED: CPT

## 2024-11-14 PROCEDURE — 86147 CARDIOLIPIN ANTIBODY EA IG: CPT

## 2024-11-14 PROCEDURE — 83088 ASSAY OF HISTAMINE: CPT

## 2024-11-14 PROCEDURE — 86800 THYROGLOBULIN ANTIBODY: CPT

## 2024-11-15 LAB
APTT PPP: 27.7 SEC (ref 24.7–36)
CRP SERPL HS-MCNC: 1.4 MG/L (ref 0–3)
FERRITIN SERPL-MCNC: 78 NG/ML (ref 10–291)
HCYS SERPL-SCNC: 10.61 UMOL/L
INR PPP: 0.97 (ref 0.87–1.13)
LA PPP-IMP: NORMAL
LMWH PPP CHRO-ACNC: <0.1 U/ML
PROTHROMBIN TIME: 12.9 SEC (ref 12–14.6)
SCREEN DRVVT: 36.4 SEC (ref 28–48)
THYROPEROXIDASE AB SERPL-ACNC: <9 IU/ML (ref 0–9)
TSH SERPL-ACNC: 1.64 UIU/ML (ref 0.35–5.5)

## 2024-11-16 LAB
B2 GLYCOPROT1 IGA SER-ACNC: <10 SAU
CARDIOLIPIN IGA SER IA-ACNC: <10 APL
CARDIOLIPIN IGG SER IA-ACNC: <10 GPL
CARDIOLIPIN IGM SER IA-ACNC: <10 MPL
PCA IGG SER-ACNC: 1.5 UNITS (ref 0–24.9)
THYROGLOB AB SERPL-ACNC: <0.9 IU/ML (ref 0–4)

## 2024-11-17 LAB
CGA SERPL-MCNC: 51 NG/ML (ref 0–187)
TRYPTASE SERPL-MCNC: 4.8 UG/L

## 2024-11-18 LAB
COLLECT DURATION TIME SPEC: NORMAL HR
CREAT 24H UR-MCNC: 16 MG/DL
HISTAMINE BLD-SCNC: 582 NMOL/L (ref 180–1800)
HISTAMINE SERPL-SCNC: <8 NMOL/L (ref 0–8)
METANEPH 24H UR-MCNC: 15 UG/L
METANEPH 24H UR-MRATE: NORMAL UG/D (ref 36–229)
METANEPH/CREAT 24H UR: 94 UG/G CRT (ref 0–300)
METANEPHS SERPL-SCNC: 0.18 NMOL/L (ref 0–0.49)
NORMETANEPHRINE 24H UR-MCNC: 20 UG/L
NORMETANEPHRINE 24H UR-MRATE: NORMAL UG/D (ref 95–650)
NORMETANEPHRINE SERPL-SCNC: 0.28 NMOL/L (ref 0–0.89)
NORMETANEPHRINE/CREAT 24H UR: 125 UG/G CRT (ref 0–400)
SPECIMEN VOL ?TM UR: NORMAL ML

## 2025-03-25 ENCOUNTER — APPOINTMENT (OUTPATIENT)
Dept: MEDICAL GROUP | Facility: PHYSICIAN GROUP | Age: 50
End: 2025-03-25

## 2025-03-25 ENCOUNTER — RESULTS FOLLOW-UP (OUTPATIENT)
Dept: MEDICAL GROUP | Facility: PHYSICIAN GROUP | Age: 50
End: 2025-03-25

## 2025-03-25 VITALS
HEIGHT: 68 IN | TEMPERATURE: 98.9 F | OXYGEN SATURATION: 99 % | DIASTOLIC BLOOD PRESSURE: 60 MMHG | BODY MASS INDEX: 20.79 KG/M2 | HEART RATE: 93 BPM | SYSTOLIC BLOOD PRESSURE: 102 MMHG | WEIGHT: 137.2 LBS

## 2025-03-25 DIAGNOSIS — G43.009 ATYPICAL MIGRAINE: ICD-10-CM

## 2025-03-25 DIAGNOSIS — Z09 HOSPITAL DISCHARGE FOLLOW-UP: Primary | ICD-10-CM

## 2025-03-25 DIAGNOSIS — R42 DIZZINESS: ICD-10-CM

## 2025-03-25 DIAGNOSIS — Z30.011 ENCOUNTER FOR INITIAL PRESCRIPTION OF CONTRACEPTIVE PILLS: ICD-10-CM

## 2025-03-25 DIAGNOSIS — F32.81 PMDD (PREMENSTRUAL DYSPHORIC DISORDER): ICD-10-CM

## 2025-03-25 DIAGNOSIS — F41.9 ANXIETY: ICD-10-CM

## 2025-03-25 LAB
POCT INT CON NEG: NEGATIVE
POCT INT CON POS: POSITIVE
POCT URINE PREGNANCY TEST: NEGATIVE

## 2025-03-25 PROCEDURE — 81025 URINE PREGNANCY TEST: CPT | Performed by: FAMILY MEDICINE

## 2025-03-25 PROCEDURE — 3078F DIAST BP <80 MM HG: CPT | Performed by: FAMILY MEDICINE

## 2025-03-25 PROCEDURE — 3074F SYST BP LT 130 MM HG: CPT | Performed by: FAMILY MEDICINE

## 2025-03-25 PROCEDURE — 99215 OFFICE O/P EST HI 40 MIN: CPT | Performed by: FAMILY MEDICINE

## 2025-03-25 RX ORDER — SUMATRIPTAN 50 MG/1
50 TABLET, FILM COATED ORAL
Qty: 10 TABLET | Refills: 3 | Status: SHIPPED | OUTPATIENT
Start: 2025-03-25

## 2025-03-25 RX ORDER — CLONAZEPAM 1 MG/1
TABLET ORAL
COMMUNITY
Start: 2025-01-11

## 2025-03-25 RX ORDER — LAMOTRIGINE 100 MG/1
TABLET ORAL
COMMUNITY
Start: 2025-03-08

## 2025-03-25 RX ORDER — NORTRIPTYLINE HYDROCHLORIDE 10 MG/1
CAPSULE ORAL
COMMUNITY
Start: 2025-03-08

## 2025-03-25 ASSESSMENT — PATIENT HEALTH QUESTIONNAIRE - PHQ9
SUM OF ALL RESPONSES TO PHQ QUESTIONS 1-9: 13
CLINICAL INTERPRETATION OF PHQ2 SCORE: 2
5. POOR APPETITE OR OVEREATING: 3 - NEARLY EVERY DAY

## 2025-03-25 ASSESSMENT — FIBROSIS 4 INDEX: FIB4 SCORE: 0.53

## 2025-03-25 NOTE — PROGRESS NOTES
Verbal consent was acquired by the patient to use [employer] ambient listening note generation during this visit     Subjective:     Maribel Romo is a 49 y.o. female who presents for Hospital Follow-up.    Transitional Care Management      Patient in the ED on 11/11/24 and initially admitted but discharged same day.    HPI:   Patient was seen in the ED for dizziness and tingling.    History of Present Illness  The patient presents for evaluation of migraines, anxiety, dizziness, and menstrual irregularities.    COVID-19 Infection and Nervous System Involvement  She experienced a COVID-19 infection in October, which resulted in severe vertigo and an intense headache persisting for nearly 3 weeks. As these symptoms subsided, she noticed signs of nervous system involvement, including heightened anxiety and mood swings. She sought medical attention at a hospital where comprehensive tests were conducted. In December, she began seeing a psychiatrist and a therapist. 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 The dosage of nortriptyline was reduced to manage her migraines, and she was started on lamotrigine, titrated up to 100 mg. Despite this, she continues to struggle with anxiety and believes the current medication dosage may be ineffective.  - Onset: COVID-19 infection in October.  - Duration: Severe vertigo and intense headache for nearly 3 weeks; ongoing anxiety and mood swings since then.  - Character: Heightened anxiety, mood swings, cyclothymia diagnosis.  - Alleviating/Aggravating Factors: Nortriptyline exacerbated mood fluctuations; reduced dosage and started on lamotrigine.  - Severity: Persistent anxiety and mood swings.    Migraines  She has been maintaining a symptom diary since December and has observed changes in her menstrual cycle. Her therapist suggested that these changes could be due to perimenopause or hormonal  fluctuations. She experiences migraines around her menstrual cycle, starting 5 days before and continuing for 6 days during her period. Severe migraines confine her to bed due to vertigo. Once these symptoms resolve, she experiences ovulation pain, night sweats, and urinary incontinence while walking her dog. Her migraines are characterized by a pulsating sensation, often in the back of her head, and increased sensitivity to sound and light. She notes that she will get hearing changes on the right and some vision changes prior to the onset of her migraines.  She also experiences daily headaches and has been anxious for the past 3 days. She takes Tylenol 500 mg daily and dual action Advil for pain relief. She tried Imitrex once in November, which provided immediate relief but was followed by a headache the next day.  - Onset: Migraines around menstrual cycle, starting 5 days before and continuing for 6 days during period.  - Location: Pulsating sensation often in the back of her head.  - Duration: Migraines lasting 11 days around menstrual cycle; daily headaches.  - Character: Pulsating sensation, increased sensitivity to sound and light, severe enough to confine her to bed due to vertigo.  - Alleviating/Aggravating Factors: Tylenol 500 mg daily, dual action Advil, Imitrex 50 mg provided immediate relief but followed by headache next day.  - Timing: Migraines around menstrual cycle; daily headaches.  - Severity: Severe migraines confining her to bed; daily headaches.    Dizziness  She experiences persistent dizziness, which is more pronounced when looking down or at sunset. She was hospitalized in November due to dizziness and tingling but was discharged the same day. She continues to experience regular dizziness and has been seeing Dr. Hendrickson, a neurological chiropractor, for balance adjustments using a VNG machine.  - Onset: Persistent dizziness since COVID-19 infection.  - Duration: Regular dizziness.  - Character:  Dizziness more pronounced when looking down or at sunset.  - Alleviating/Aggravating Factors: Seeing Dr. Hendrickson for balance adjustments using a VNG machine.  - Timing: Persistent dizziness.  - Severity: Severe enough to warrant hospitalization in November.    Menstrual Irregularities and Pelvic Pain  She has been experiencing severe pelvic pain for the past 6 days, which she describes as ovary pain. She has a history of poor tolerance to birth control pills, with the last use being 15 years ago. She maintains a gluten, dairy, soy, sugar, chocolate, and caffeine-free diet, drinks 96 to 128 ounces of water daily, and has increased her salt intake to 9 g per day to manage her blood pressure. She experiences severe POTS symptoms during her period, with blood pressure readings as low as 91/60. She also reports burning sensations in her head, right ear fullness during headaches, and neck soreness. She has not had an MRI of her brain. She takes nattokinase, a natural blood thinner, but not daily due to bruising. She has not used birth control for the past 15 to 20 years due to emotional side effects. She is currently taking L-methylfolate due to poor conversion of folic acid to its active form.  - Onset: Severe pelvic pain for the past 6 days.  - Duration: Severe pelvic pain for 6 days; menstrual irregularities since December.  - Character: Ovary pain, severe POTS symptoms during period, burning sensations in head, right ear fullness during headaches, neck soreness.  - Alleviating/Aggravating Factors: Gluten, dairy, soy, sugar, chocolate, and caffeine-free diet; increased salt intake to 9 g per day; nattokinase not taken daily due to bruising.  - Timing: Severe POTS symptoms during period; pelvic pain for 6 days.  - Severity: Severe pelvic pain; severe POTS symptoms with blood pressure readings as low as 91/60.    MEDICATIONS  Current: nortriptyline, lamotrigine, Tylenol, Advil, L-methylfolate  Past: NuvaRing        Current  "medicines (including reconciliation performed today)  Current Outpatient Medications   Medication Sig Dispense Refill    lamoTRIgine (LAMICTAL) 100 MG Tab PLEASE SEE ATTACHED FOR DETAILED DIRECTIONS      nortriptyline (PAMELOR) 10 MG Cap TAKE 2 CAPSULES BY MOUTH ONCE DAILY AT BEDTIME      clonazePAM (KLONOPIN) 1 MG Tab PLEASE SEE ATTACHED FOR DETAILED DIRECTIONS      SUMAtriptan (IMITREX) 50 MG Tab Take 1 Tablet by mouth one time as needed for Migraine for up to 1 dose. May repeat dose x1 in 45-60 minutes if ineffective 10 Tablet 3    Norgestrel 0.075 MG Tab Take 1 Tablet by mouth every day. 84 Each 3    meclizine (ANTIVERT) 25 MG Tab Take 25 mg by mouth 3 times a day as needed.      acetaminophen (TYLENOL) 500 MG Tab Take 1,000 mg by mouth every 8 hours as needed for Mild Pain.      propranolol (INDERAL) 10 MG Tab Take 1 Tablet by mouth 3 times a day. 270 Tablet 3    hydrOXYzine HCl (ATARAX) 25 MG Tab Take 1 Tablet by mouth 2 times a day as needed for Anxiety. 180 Tablet 0    ibuprofen (MOTRIN) 200 MG Tab Take 400 mg by mouth every 8 hours as needed for Mild Pain.       No current facility-administered medications for this visit.       Allergies:   Caffeine, Keflex, Dairy food allergy, Gluten meal, Seasonal, and Soy allergy    Social History     Tobacco Use    Smoking status: Former     Current packs/day: 0.00     Types: Cigarettes     Start date: 2000     Quit date: 2004     Years since quittin.5    Smokeless tobacco: Never   Vaping Use    Vaping status: Never Used   Substance Use Topics    Alcohol use: Not Currently    Drug use: No         Objective:     Vitals:    25 1247   BP: 102/60   BP Location: Right arm   Patient Position: Sitting   BP Cuff Size: Adult   Pulse: 93   Temp: 37.2 °C (98.9 °F)   TempSrc: Temporal   SpO2: 99%   Weight: 62.2 kg (137 lb 3.2 oz)   Height: 1.727 m (5' 8\")     Body mass index is 20.86 kg/m².    Physical Exam:  Physical Exam  Constitutional:       Appearance: " Normal appearance.   HENT:      Head: Normocephalic and atraumatic.      Nose: Nose normal.      Mouth/Throat:      Mouth: Mucous membranes are moist.   Eyes:      Extraocular Movements: Extraocular movements intact.      Conjunctiva/sclera: Conjunctivae normal.      Pupils: Pupils are equal, round, and reactive to light.   Cardiovascular:      Rate and Rhythm: Normal rate and regular rhythm.      Heart sounds: No murmur heard.  Pulmonary:      Effort: Pulmonary effort is normal.      Breath sounds: Normal breath sounds.   Abdominal:      General: Abdomen is flat. Bowel sounds are normal.      Palpations: Abdomen is soft.   Musculoskeletal:      Cervical back: Neck supple. Normal range of motion.   Skin:     General: Skin is warm and dry.   Neurological:      General: No focal deficit present.      Mental Status: She is alert and oriented to person, place, and time.   Psychiatric:         Mood and Affect: Mood is anxious.          Assessment and Plan:   1. Hospital discharge follow-up        2. Atypical migraine  SUMAtriptan (IMITREX) 50 MG Tab      3. Encounter for initial prescription of contraceptive pills  Norgestrel 0.075 MG Tab    POCT Pregnancy      4. Anxiety        5. Dizziness        6. PMDD (premenstrual dysphoric disorder)             Hospital discharge     - Chart was reviewed. Patient admitted to OBS  - Hospitalization and results reviewed with patient.   - Medications reviewed including instructions regarding high risk medications, dosing and side effects.  - Recommended Services: No services needed at this time  - Advance directive/POLST on file?  No     2. Dizziness/Migraines    - Chart was reviewed. Patient admitted to OBS  - Hospitalization and results reviewed with patient.   - Medications reviewed including instructions regarding high risk medications, dosing and side effects.  - Migraines with auras suspected at this time  - Suspect  that patient's migraines are partially related to her  menstrual cycle in both the migraine and anxiety related to her cycle  - Will refill patient's Imitrex today   - discussed dosing with patient  - Patient to continue propranolol and nortriptyline from psychiatry  - Meclizine prn as well  - Will work on regulating patient's cycle while waiting for her OB/Gyn apt with [facility] in May    - progesterone only contraception at this time    3. Anxiety/PMDD    - Chart was reviewed. Patient admitted to OBS  - Hospitalization and results reviewed with patient.   - Suspect  that patient's anxiety related to her menstrual cycle  - Patient to continue propranolol. Hydroxyzine prn and clonazepam 1 mg prn and nortriptyline from psychiatry  - Will work on regulating patient's cycle while waiting for her OB/Gyn apt with [facility] in May    - progesterone only contraception at this time      Follow-up:Return if symptoms worsen or fail to improve.        I personally reviewed and updated and reviewed the patient's personal, social, family and surgical history at today's appointment.     I spent a total of 40 minutes with record review, exam, communication with the patient, communication with other providers, and documentation of this encounter before, during and after the patient visit on the date of the visit.

## 2025-04-22 ENCOUNTER — HOSPITAL ENCOUNTER (OUTPATIENT)
Dept: LAB | Facility: MEDICAL CENTER | Age: 50
End: 2025-04-22
Attending: CHIROPRACTOR
Payer: COMMERCIAL

## 2025-04-22 LAB
25(OH)D3 SERPL-MCNC: 53 NG/ML (ref 30–100)
ALBUMIN SERPL BCP-MCNC: 4.4 G/DL (ref 3.2–4.9)
ALP SERPL-CCNC: 55 U/L (ref 30–99)
ALT SERPL-CCNC: 16 U/L (ref 2–50)
AST SERPL-CCNC: 17 U/L (ref 12–45)
BASOPHILS # BLD AUTO: 0.5 % (ref 0–1.8)
BASOPHILS # BLD: 0.03 K/UL (ref 0–0.12)
BILIRUB CONJ SERPL-MCNC: 0.3 MG/DL (ref 0.1–0.5)
BILIRUB INDIRECT SERPL-MCNC: 0.4 MG/DL (ref 0–1)
BILIRUB SERPL-MCNC: 0.7 MG/DL (ref 0.1–1.5)
BUN SERPL-MCNC: 14 MG/DL (ref 8–22)
CALCIUM ALBUM COR SERPL-MCNC: 8.8 MG/DL (ref 8.5–10.5)
CALCIUM SERPL-MCNC: 9.1 MG/DL (ref 8.5–10.5)
CHLORIDE SERPL-SCNC: 104 MMOL/L (ref 96–112)
CO2 SERPL-SCNC: 25 MMOL/L (ref 20–33)
CREAT SERPL-MCNC: 0.82 MG/DL (ref 0.5–1.4)
EOSINOPHIL # BLD AUTO: 0.03 K/UL (ref 0–0.51)
EOSINOPHIL NFR BLD: 0.5 % (ref 0–6.9)
ERYTHROCYTE [DISTWIDTH] IN BLOOD BY AUTOMATED COUNT: 40.9 FL (ref 35.9–50)
ESTRADIOL SERPL-MCNC: 77.6 PG/ML
FERRITIN SERPL-MCNC: 32.5 NG/ML (ref 10–291)
FSH SERPL-ACNC: 3.2 MIU/ML
GFR SERPLBLD CREATININE-BSD FMLA CKD-EPI: 88 ML/MIN/1.73 M 2
GLUCOSE SERPL-MCNC: 87 MG/DL (ref 65–99)
HCT VFR BLD AUTO: 39.6 % (ref 37–47)
HCYS SERPL-SCNC: 8.22 UMOL/L
HGB BLD-MCNC: 12.9 G/DL (ref 12–16)
IMM GRANULOCYTES # BLD AUTO: 0.01 K/UL (ref 0–0.11)
IMM GRANULOCYTES NFR BLD AUTO: 0.2 % (ref 0–0.9)
LH SERPL-ACNC: 1.2 IU/L
LYMPHOCYTES # BLD AUTO: 1.31 K/UL (ref 1–4.8)
LYMPHOCYTES NFR BLD: 20.8 % (ref 22–41)
MCH RBC QN AUTO: 29.6 PG (ref 27–33)
MCHC RBC AUTO-ENTMCNC: 32.6 G/DL (ref 32.2–35.5)
MCV RBC AUTO: 90.8 FL (ref 81.4–97.8)
MONOCYTES # BLD AUTO: 0.35 K/UL (ref 0–0.85)
MONOCYTES NFR BLD AUTO: 5.5 % (ref 0–13.4)
NEUTROPHILS # BLD AUTO: 4.58 K/UL (ref 1.82–7.42)
NEUTROPHILS NFR BLD: 72.5 % (ref 44–72)
NRBC # BLD AUTO: 0 K/UL
NRBC BLD-RTO: 0 /100 WBC (ref 0–0.2)
PHOSPHATE SERPL-MCNC: 3.1 MG/DL (ref 2.5–4.5)
PLATELET # BLD AUTO: 269 K/UL (ref 164–446)
PMV BLD AUTO: 10.1 FL (ref 9–12.9)
POTASSIUM SERPL-SCNC: 4.1 MMOL/L (ref 3.6–5.5)
PROGEST SERPL-MCNC: 8.63 NG/ML
PROT SERPL-MCNC: 7 G/DL (ref 6–8.2)
RBC # BLD AUTO: 4.36 M/UL (ref 4.2–5.4)
SODIUM SERPL-SCNC: 139 MMOL/L (ref 135–145)
THYROPEROXIDASE AB SERPL-ACNC: <9 IU/ML (ref 0–9)
TSH SERPL-ACNC: 1.4 UIU/ML (ref 0.38–5.33)
WBC # BLD AUTO: 6.3 K/UL (ref 4.8–10.8)

## 2025-04-22 PROCEDURE — 84443 ASSAY THYROID STIM HORMONE: CPT

## 2025-04-22 PROCEDURE — 84075 ASSAY ALKALINE PHOSPHATASE: CPT

## 2025-04-22 PROCEDURE — 84403 ASSAY OF TOTAL TESTOSTERONE: CPT

## 2025-04-22 PROCEDURE — 86376 MICROSOMAL ANTIBODY EACH: CPT

## 2025-04-22 PROCEDURE — 82670 ASSAY OF TOTAL ESTRADIOL: CPT

## 2025-04-22 PROCEDURE — 84460 ALANINE AMINO (ALT) (SGPT): CPT

## 2025-04-22 PROCEDURE — 85025 COMPLETE CBC W/AUTO DIFF WBC: CPT

## 2025-04-22 PROCEDURE — 36415 COLL VENOUS BLD VENIPUNCTURE: CPT

## 2025-04-22 PROCEDURE — 80069 RENAL FUNCTION PANEL: CPT

## 2025-04-22 PROCEDURE — 83090 ASSAY OF HOMOCYSTEINE: CPT

## 2025-04-22 PROCEDURE — 82166 ASSAY ANTI-MULLERIAN HORM: CPT

## 2025-04-22 PROCEDURE — 82671 ASSAY OF ESTROGENS: CPT

## 2025-04-22 PROCEDURE — 84450 TRANSFERASE (AST) (SGOT): CPT

## 2025-04-22 PROCEDURE — 82306 VITAMIN D 25 HYDROXY: CPT

## 2025-04-22 PROCEDURE — 83001 ASSAY OF GONADOTROPIN (FSH): CPT

## 2025-04-22 PROCEDURE — 82626 DEHYDROEPIANDROSTERONE: CPT

## 2025-04-22 PROCEDURE — 82248 BILIRUBIN DIRECT: CPT

## 2025-04-22 PROCEDURE — 84155 ASSAY OF PROTEIN SERUM: CPT

## 2025-04-22 PROCEDURE — 83002 ASSAY OF GONADOTROPIN (LH): CPT

## 2025-04-22 PROCEDURE — 82247 BILIRUBIN TOTAL: CPT

## 2025-04-22 PROCEDURE — 84144 ASSAY OF PROGESTERONE: CPT

## 2025-04-22 PROCEDURE — 82728 ASSAY OF FERRITIN: CPT

## 2025-04-25 LAB — MIS SERPL-MCNC: 0.6 NG/ML

## 2025-04-26 LAB
DHEA SERPL-MCNC: 4.85 NG/ML (ref 0.63–4.7)
ESTRADIOL SERPL HS-MCNC: 69.2 PG/ML
ESTROGEN SERPL CALC-MCNC: 125.7 PG/ML
ESTRONE SERPL-MCNC: 56.5 PG/ML
TESTOST SERPL-MCNC: 18 NG/DL (ref 9–55)

## 2025-05-14 ENCOUNTER — HOSPITAL ENCOUNTER (OUTPATIENT)
Dept: RADIOLOGY | Facility: MEDICAL CENTER | Age: 50
End: 2025-05-14
Attending: CHIROPRACTOR
Payer: COMMERCIAL

## 2025-05-14 DIAGNOSIS — K82.8 ADHESION OF GALLBLADDER: ICD-10-CM

## 2025-05-14 DIAGNOSIS — E06.3 CHRONIC LYMPHOCYTIC THYROIDITIS: ICD-10-CM

## 2025-05-14 PROCEDURE — 76830 TRANSVAGINAL US NON-OB: CPT

## 2025-05-23 ENCOUNTER — HOSPITAL ENCOUNTER (OUTPATIENT)
Dept: LAB | Facility: MEDICAL CENTER | Age: 50
End: 2025-05-23
Attending: OBSTETRICS & GYNECOLOGY
Payer: COMMERCIAL

## 2025-05-23 LAB — FSH SERPL-ACNC: 12 MIU/ML

## 2025-05-23 PROCEDURE — 83001 ASSAY OF GONADOTROPIN (FSH): CPT

## 2025-05-23 PROCEDURE — 36415 COLL VENOUS BLD VENIPUNCTURE: CPT

## 2025-06-02 ENCOUNTER — APPOINTMENT (OUTPATIENT)
Dept: RADIOLOGY | Facility: MEDICAL CENTER | Age: 50
End: 2025-06-02
Attending: CHIROPRACTOR
Payer: COMMERCIAL

## 2025-06-09 DIAGNOSIS — F41.9 ANXIETY: ICD-10-CM

## 2025-06-10 RX ORDER — HYDROXYZINE HYDROCHLORIDE 25 MG/1
25 TABLET, FILM COATED ORAL 2 TIMES DAILY PRN
Qty: 180 TABLET | Refills: 0 | Status: SHIPPED | OUTPATIENT
Start: 2025-06-10

## 2025-06-10 NOTE — TELEPHONE ENCOUNTER
Received request via: Pharmacy    Was the patient seen in the last year in this department? Yes    Does the patient have an active prescription (recently filled or refills available) for medication(s) requested? No    Pharmacy Name:     Does the patient have [facility] Plus and need 100-day supply? (This applies to ALL medications) Patient does not have SCP

## 2025-07-27 DIAGNOSIS — R22.2 LUMP OF SKIN OF BACK: Primary | ICD-10-CM

## 2025-08-03 DIAGNOSIS — G43.009 ATYPICAL MIGRAINE: ICD-10-CM

## 2025-08-04 RX ORDER — SUMATRIPTAN 50 MG/1
50 TABLET, FILM COATED ORAL
Qty: 9 TABLET | Refills: 3 | Status: SHIPPED | OUTPATIENT
Start: 2025-08-04

## (undated) DEVICE — SLEEVE, VASO, THIGH, MED

## (undated) DEVICE — MASK ANESTHESIA ADULT  - (100/CA)

## (undated) DEVICE — HEAD HOLDER JUNIOR/ADULT

## (undated) DEVICE — GLOVE BIOGEL SZ 7.5 SURGICAL PF LTX - (50PR/BX 4BX/CA)

## (undated) DEVICE — SET EXTENSION WITH 2 PORTS (48EA/CA) ***PART #2C8610 IS A SUBSTITUTE*****

## (undated) DEVICE — SODIUM CHL IRRIGATION 0.9% 1000ML (12EA/CA)

## (undated) DEVICE — GAUZE FLUFF STERILE 2-PLY 36 X 36 (100EA/CA)

## (undated) DEVICE — GLOVE, BIOGEL ECLIPSE, SZ 7.0, PF LTX (50/BX)

## (undated) DEVICE — SUTURE GENERAL

## (undated) DEVICE — SUTURE 2-0 CHROMIC GUT SH 27 (36PK/BX)"

## (undated) DEVICE — TRAY SRGPRP PVP IOD WT PRP - (20/CA)

## (undated) DEVICE — KIT ANESTHESIA W/CIRCUIT & 3/LT BAG W/FILTER (20EA/CA)

## (undated) DEVICE — PACK MINOR BASIN - (2EA/CA)

## (undated) DEVICE — KIT ROOM DECONTAMINATION

## (undated) DEVICE — SENSOR SPO2 NEO LNCS ADHESIVE (20/BX) SEE USER NOTES

## (undated) DEVICE — BRIEF STRETCH MATERNITY M/L - FITS 20-60IN (5EA/BG 20BG/CA)

## (undated) DEVICE — LACTATED RINGERS INJ 1000 ML - (14EA/CA 60CA/PF)

## (undated) DEVICE — JELLY, KY 2 0Z STERILE

## (undated) DEVICE — NEPTUNE 4 PORT MANIFOLD - (20/PK)

## (undated) DEVICE — SET LEADWIRE 5 LEAD BEDSIDE DISPOSABLE ECG (1SET OF 5/EA)

## (undated) DEVICE — TUBING CLEARLINK DUO-VENT - C-FLO (48EA/CA)

## (undated) DEVICE — ELECTRODE 850 FOAM ADHESIVE - HYDROGEL RADIOTRNSPRNT (50/PK)

## (undated) DEVICE — PROTECTOR ULNA NERVE - (36PR/CA)

## (undated) DEVICE — CANISTER SUCTION 3000ML MECHANICAL FILTER AUTO SHUTOFF MEDI-VAC NONSTERILE LF DISP  (40EA/CA)

## (undated) DEVICE — ELECTRODE DUAL RETURN W/ CORD - (50/PK)

## (undated) DEVICE — GOWN WARMING STANDARD FLEX - (30/CA)

## (undated) DEVICE — SUCTION INSTRUMENT YANKAUER BULBOUS TIP W/O VENT (50EA/CA)